# Patient Record
Sex: MALE | Race: OTHER | NOT HISPANIC OR LATINO | URBAN - METROPOLITAN AREA
[De-identification: names, ages, dates, MRNs, and addresses within clinical notes are randomized per-mention and may not be internally consistent; named-entity substitution may affect disease eponyms.]

---

## 2017-06-12 ENCOUNTER — OUTPATIENT (OUTPATIENT)
Dept: OUTPATIENT SERVICES | Facility: HOSPITAL | Age: 67
LOS: 1 days | Discharge: HOME | End: 2017-06-12

## 2017-06-28 DIAGNOSIS — I10 ESSENTIAL (PRIMARY) HYPERTENSION: ICD-10-CM

## 2018-04-09 ENCOUNTER — OUTPATIENT (OUTPATIENT)
Dept: OUTPATIENT SERVICES | Facility: HOSPITAL | Age: 68
LOS: 1 days | Discharge: HOME | End: 2018-04-09

## 2018-04-09 DIAGNOSIS — I10 ESSENTIAL (PRIMARY) HYPERTENSION: ICD-10-CM

## 2018-04-09 DIAGNOSIS — Z00.00 ENCOUNTER FOR GENERAL ADULT MEDICAL EXAMINATION WITHOUT ABNORMAL FINDINGS: ICD-10-CM

## 2018-04-09 DIAGNOSIS — Z13.1 ENCOUNTER FOR SCREENING FOR DIABETES MELLITUS: ICD-10-CM

## 2019-10-28 ENCOUNTER — OUTPATIENT (OUTPATIENT)
Dept: OUTPATIENT SERVICES | Facility: HOSPITAL | Age: 69
LOS: 1 days | Discharge: HOME | End: 2019-10-28

## 2019-10-28 DIAGNOSIS — Z13.1 ENCOUNTER FOR SCREENING FOR DIABETES MELLITUS: ICD-10-CM

## 2019-10-28 DIAGNOSIS — I10 ESSENTIAL (PRIMARY) HYPERTENSION: ICD-10-CM

## 2019-10-28 DIAGNOSIS — Z00.00 ENCOUNTER FOR GENERAL ADULT MEDICAL EXAMINATION WITHOUT ABNORMAL FINDINGS: ICD-10-CM

## 2019-10-28 DIAGNOSIS — Z13.29 ENCOUNTER FOR SCREENING FOR OTHER SUSPECTED ENDOCRINE DISORDER: ICD-10-CM

## 2022-05-25 PROBLEM — Z00.00 ENCOUNTER FOR PREVENTIVE HEALTH EXAMINATION: Status: ACTIVE | Noted: 2022-05-25

## 2022-06-06 ENCOUNTER — APPOINTMENT (OUTPATIENT)
Dept: CARDIOLOGY | Facility: CLINIC | Age: 72
End: 2022-06-06
Payer: MEDICARE

## 2022-06-06 VITALS
HEIGHT: 64 IN | SYSTOLIC BLOOD PRESSURE: 110 MMHG | DIASTOLIC BLOOD PRESSURE: 80 MMHG | BODY MASS INDEX: 37.39 KG/M2 | HEART RATE: 63 BPM | WEIGHT: 219 LBS

## 2022-06-06 DIAGNOSIS — Z78.9 OTHER SPECIFIED HEALTH STATUS: ICD-10-CM

## 2022-06-06 DIAGNOSIS — Z87.891 PERSONAL HISTORY OF NICOTINE DEPENDENCE: ICD-10-CM

## 2022-06-06 PROCEDURE — 99204 OFFICE O/P NEW MOD 45 MIN: CPT

## 2022-06-06 PROCEDURE — 93000 ELECTROCARDIOGRAM COMPLETE: CPT

## 2022-06-06 RX ORDER — OLMESARTAN MEDOXOMIL AND HYDROCHLOROTHIAZIDE 20; 12.5 MG/1; MG/1
20-12.5 TABLET ORAL DAILY
Qty: 90 | Refills: 3 | Status: ACTIVE | COMMUNITY

## 2022-06-06 RX ORDER — ASPIRIN 81 MG
81 TABLET, DELAYED RELEASE (ENTERIC COATED) ORAL DAILY
Refills: 0 | Status: ACTIVE | COMMUNITY

## 2022-06-06 NOTE — ASSESSMENT
[FreeTextEntry1] : Senior male with several cardiac risk factors.\par Exertional dyspnea / possible angina.\par \par Reported mild valve disease.\par \par BP controlled.

## 2022-06-06 NOTE — DISCUSSION/SUMMARY
[FreeTextEntry1] : Stress ECHO to evaluate for structural heart disease and stress induced ischemia.\par Cont Olmesartan-HCTZ.\par Follow-up 6-weeks.

## 2022-06-06 NOTE — PHYSICAL EXAM
[de-identified] : Sushant CORONEL [de-identified] : well appearing, overweight, no distress [de-identified] : reg, nL s12/s2, no m/r/g [de-identified] : CTA [de-identified] : alert, normal affect, logical conversation

## 2022-06-06 NOTE — HISTORY OF PRESENT ILLNESS
[FreeTextEntry1] : 72 year-old male referred for cardiac evaluation.\par \par False positive MPI / reportedly normal cath (15-y ago).\par Reported mild valve regurgitation.\par \par Risk factors include HTN.\par \par Relatively active (housework / walks dog 4-times / day).  Mild exertional dyspnea with prolonged distances / hills.  Attributes to knee pain / laboring.\par \par No chest pain.\par \par No palpitations, lightheadedness, syncope.

## 2022-07-22 ENCOUNTER — APPOINTMENT (OUTPATIENT)
Dept: CARDIOLOGY | Facility: CLINIC | Age: 72
End: 2022-07-22

## 2022-07-22 VITALS
WEIGHT: 213 LBS | DIASTOLIC BLOOD PRESSURE: 76 MMHG | SYSTOLIC BLOOD PRESSURE: 118 MMHG | HEIGHT: 64 IN | HEART RATE: 72 BPM | BODY MASS INDEX: 36.37 KG/M2

## 2022-07-22 DIAGNOSIS — Z86.79 PERSONAL HISTORY OF OTHER DISEASES OF THE CIRCULATORY SYSTEM: ICD-10-CM

## 2022-07-22 PROCEDURE — 99214 OFFICE O/P EST MOD 30 MIN: CPT | Mod: 25

## 2022-07-22 PROCEDURE — 93325 DOPPLER ECHO COLOR FLOW MAPG: CPT

## 2022-07-22 PROCEDURE — 93320 DOPPLER ECHO COMPLETE: CPT

## 2022-07-22 PROCEDURE — 93000 ELECTROCARDIOGRAM COMPLETE: CPT | Mod: 59

## 2022-07-22 PROCEDURE — 93351 STRESS TTE COMPLETE: CPT

## 2022-07-22 NOTE — REASON FOR VISIT
[FreeTextEntry1] : Scheduled for bilateral knee replacement.\par \par Feels well.\par \par Remains active.\par \par No angina.  Breathing comfortable.\par \par Stress ECHO (7/22/22): nL LVSF.  Trace AI / MR.  6:31 (92%). nL EKG / ECHO response.

## 2022-07-22 NOTE — DISCUSSION/SUMMARY
[FreeTextEntry1] : IMPRESSION\par Mild exertional dyspnea.\par Likely related to obesity / deconditioning.\par \par Trace valvular regurgitation (AI / MR).\par \par BP controlled.\par \par Low risk patient for perioperative cardiac events (RCRI = 0).\par Intermediate risk procedure.\par Reassuring stress ECHO.\par \par RECOMMEND:\par No further cardiac testing required prior to knee replacement.\par Cont Olmesartan-HCTZ.\par Regular PMD follow-up.\par Follow-up 1-year or as needed.

## 2022-07-22 NOTE — PHYSICAL EXAM
[de-identified] : well appearing, overweight, no distress [de-identified] : Sushant CORONEL [de-identified] : alert, normal affect, logical conversation

## 2022-09-22 ENCOUNTER — TRANSCRIPTION ENCOUNTER (OUTPATIENT)
Age: 72
End: 2022-09-22

## 2022-10-24 ENCOUNTER — RESULT REVIEW (OUTPATIENT)
Age: 72
End: 2022-10-24

## 2022-10-24 ENCOUNTER — OUTPATIENT (OUTPATIENT)
Dept: OUTPATIENT SERVICES | Facility: HOSPITAL | Age: 72
LOS: 1 days | End: 2022-10-24
Payer: MEDICARE

## 2022-10-24 ENCOUNTER — APPOINTMENT (OUTPATIENT)
Dept: ORTHOPEDIC SURGERY | Facility: CLINIC | Age: 72
End: 2022-10-24

## 2022-10-24 VITALS
HEART RATE: 60 BPM | HEIGHT: 64 IN | TEMPERATURE: 97.6 F | OXYGEN SATURATION: 97 % | BODY MASS INDEX: 36.37 KG/M2 | SYSTOLIC BLOOD PRESSURE: 134 MMHG | WEIGHT: 213 LBS | DIASTOLIC BLOOD PRESSURE: 80 MMHG

## 2022-10-24 DIAGNOSIS — M16.9 OSTEOARTHRITIS OF HIP, UNSPECIFIED: ICD-10-CM

## 2022-10-24 PROCEDURE — 99213 OFFICE O/P EST LOW 20 MIN: CPT

## 2022-10-24 PROCEDURE — 73564 X-RAY EXAM KNEE 4 OR MORE: CPT | Mod: 26,LT

## 2022-10-24 PROCEDURE — 99203 OFFICE O/P NEW LOW 30 MIN: CPT

## 2022-10-24 PROCEDURE — 73502 X-RAY EXAM HIP UNI 2-3 VIEWS: CPT | Mod: 26,RT

## 2022-10-24 PROCEDURE — 73564 X-RAY EXAM KNEE 4 OR MORE: CPT

## 2022-10-24 PROCEDURE — 73502 X-RAY EXAM HIP UNI 2-3 VIEWS: CPT

## 2022-10-24 RX ORDER — DICLOFENAC SODIUM 75 MG/1
75 TABLET, DELAYED RELEASE ORAL
Refills: 0 | Status: DISCONTINUED | COMMUNITY
End: 2022-10-24

## 2022-11-10 ENCOUNTER — APPOINTMENT (OUTPATIENT)
Dept: CARDIOLOGY | Facility: CLINIC | Age: 72
End: 2022-11-10

## 2022-11-10 VITALS
HEIGHT: 64 IN | HEART RATE: 61 BPM | BODY MASS INDEX: 36.54 KG/M2 | WEIGHT: 214 LBS | SYSTOLIC BLOOD PRESSURE: 126 MMHG | DIASTOLIC BLOOD PRESSURE: 80 MMHG

## 2022-11-10 PROCEDURE — 99214 OFFICE O/P EST MOD 30 MIN: CPT

## 2022-11-10 PROCEDURE — 93000 ELECTROCARDIOGRAM COMPLETE: CPT

## 2022-11-10 NOTE — ASSESSMENT
[FreeTextEntry1] : Mild exertional dyspnea.\par Likely related to obesity / deconditioning / knee pain.\par \par Trace valvular regurgitation (AI / MR).\par \par BP controlled.\par \par Low risk patient for perioperative cardiac events (RCRI = 0).\par Intermediate risk procedure.\par No cardiac decompensation.\par Reassuring stress ECHO.\par \par Stress ECHO (7/22/22): nL LVSF. Trace AI / MR. 6:31 (92%). nL EKG / ECHO response.

## 2022-11-10 NOTE — REASON FOR VISIT
[FreeTextEntry1] : Knee replacement postponed.  Required excision of benign tumor.  Now replacement scheduled.\par \par Feels well.\par \par Remains active.  No interval cardiac symptoms.\par \par Weight stable.

## 2022-11-10 NOTE — DISCUSSION/SUMMARY
[FreeTextEntry1] : No further cardiac testing required prior to knee replacement.\par Cont Olmesartan-HCTZ.\par Regular PMD follow-up.\par Follow-up 1-year or as needed.

## 2022-11-10 NOTE — PHYSICAL EXAM
[de-identified] : well appearing, overweight, no distress [de-identified] : reg, nL s1/s2, no m/r/g [de-identified] : CTA [de-identified] : alert, normal affect, logical conversation

## 2022-11-14 ENCOUNTER — RESULT REVIEW (OUTPATIENT)
Age: 72
End: 2022-11-14

## 2022-11-14 ENCOUNTER — OUTPATIENT (OUTPATIENT)
Dept: OUTPATIENT SERVICES | Facility: HOSPITAL | Age: 72
LOS: 1 days | End: 2022-11-14
Payer: MEDICARE

## 2022-11-14 ENCOUNTER — APPOINTMENT (OUTPATIENT)
Dept: ORTHOPEDIC SURGERY | Facility: CLINIC | Age: 72
End: 2022-11-14

## 2022-11-14 VITALS
OXYGEN SATURATION: 99 % | HEIGHT: 64 IN | BODY MASS INDEX: 36.54 KG/M2 | WEIGHT: 214 LBS | TEMPERATURE: 97.8 F | SYSTOLIC BLOOD PRESSURE: 134 MMHG | HEART RATE: 60 BPM | DIASTOLIC BLOOD PRESSURE: 81 MMHG

## 2022-11-14 DIAGNOSIS — M17.12 UNILATERAL PRIMARY OSTEOARTHRITIS, LEFT KNEE: ICD-10-CM

## 2022-11-14 PROCEDURE — 71046 X-RAY EXAM CHEST 2 VIEWS: CPT

## 2022-11-14 PROCEDURE — 71046 X-RAY EXAM CHEST 2 VIEWS: CPT | Mod: 26

## 2022-11-14 PROCEDURE — 99211 OFF/OP EST MAY X REQ PHY/QHP: CPT

## 2022-12-28 NOTE — HISTORY OF PRESENT ILLNESS
[de-identified] : Patient is a pleasant gentleman who was preparing to do a left total knee arthroplasty.  He had an excision of the mass which was benign.  We have been waiting for that incision to heal slowly to proceed with left knee arthroplasty.  He continues to have severe pain and limitations.  He is very anxious to proceed with surgery.  He denies any fevers chills night sweats nausea or vomiting.

## 2022-12-28 NOTE — DISCUSSION/SUMMARY
[de-identified] : Left Knee DJD. We discussed treatment options including conservative treatments and tka. Previously planned TKA delayed for mass excision which was complete with benign pathology. We reviewed recovery risks and benefits, including bleeding, infection, damage to nerves vessels tendons and ligaments, pain, stiffness, need for more surgery and death. All questions answered. We will plan for November pending completion of healing of excision scar. All questions answered. Plan home discharge.\par \par Equipment: [employer]; [employer]

## 2022-12-28 NOTE — DISCUSSION/SUMMARY
[de-identified] : Severe left knee arthritis.  We will plan left total knee arthroplasty but needed to do a biopsy procedure first.  At this point time there is no evidence for any infection of the incision but it is not completely healed and therefore I would recommend delay until it is.  We discussed this at length and the patient and spouse understand.  They can continue to monitor the wound and do the local treatments that they have been using and will reassess in a few weeks.  All questions answered.

## 2022-12-28 NOTE — HISTORY OF PRESENT ILLNESS
[8] : an average pain level of 8/10 [Standing] : standing [Daily] : ~He/She~ states the symptoms seem to be occuring daily [Direct Pressure] : worsened by direct pressure [Lifting] : worsened by lifting [Walking] : worsened by walking [Knee Flexion] : worsened with knee flexion [Knee Extension] : worsened with knee extension [Acetaminophen] : relieved by acetaminophen [de-identified] : Left knee pain with no trauma or injury. it has been worsening and is making walking difficult. Stairs are difficult as well. USing Meloxicam and Acetaminphen.  [Hip Movement] : not worsened by hip movement

## 2022-12-28 NOTE — PHYSICAL EXAM
[Antalgic] : antalgic [LE] : Sensory: Intact in bilateral lower extremities [DP] : dorsalis pedis 2+ and symmetric bilaterally [PT] : posterior tibial 2+ and symmetric bilaterally [de-identified] : Eschar with no redness or drainage from left knee anterior biopsy site [de-identified] : Xrays(3 views)  show severe DJD of left knee with tricompartmental changes and bone on bone medial compartment.

## 2022-12-28 NOTE — REVIEW OF SYSTEMS
[Joint Pain] : joint pain [Joint Stiffness] : joint stiffness [Joint Swelling] : joint swelling [Fever] : no fever [Chills] : no chills [Feeling Tired] : no fatigue [Recent Weight Gain (___ Lbs)] : no recent ~M [unfilled] weight gain

## 2022-12-28 NOTE — PHYSICAL EXAM
[de-identified] : On exam today is alert and oriented.  The incision is clean dry and intact with no erythema but there remains a central area of eschar.  He is otherwise neurologically intact.  There is a varus deformity that is not correctable.  There is no instability to the knee.

## 2023-01-12 ENCOUNTER — NON-APPOINTMENT (OUTPATIENT)
Age: 73
End: 2023-01-12

## 2023-01-13 RX ORDER — POVIDONE-IODINE 5 %
1 AEROSOL (ML) TOPICAL ONCE
Refills: 0 | Status: COMPLETED | OUTPATIENT
Start: 2023-01-17 | End: 2023-01-17

## 2023-01-13 NOTE — H&P ADULT - NSHPPHYSICALEXAM_GEN_ALL_CORE
MSK: + decreased ROM 2/2 pain, left knee      Remainder of exam per medical clearance note MSK: + decreased ROM 2/2 pain, left knee  Skin warm and well perfused, no visible wounds/erythema/ecchymoses  EHL/FHL/TA/GS 5/5 motor strength bilateral lower extremities   SLT in tact to distal bilateral lower extremities   DP pulses palpable bilaterally   Remainder of exam per medical clearance note

## 2023-01-13 NOTE — H&P ADULT - NSHPLABSRESULTS_GEN_ALL_CORE
Preop CBC, BMP, PT/INR, UA - WNL per medical clearance   Cr 1.2   Preop CXR - WNL per medical clearance   Preop EKG - sinus rhythm - WNL per medical clearance   Stress Echo 7/22/22 WNL   3M DOS

## 2023-01-13 NOTE — H&P ADULT - PROBLEM SELECTOR PLAN 1
Admit to Orthopaedic Service.  Presents today for elective   Pt medically stable and cleared for procedure today by Dr. Rose and Dr. Ferro

## 2023-01-13 NOTE — H&P ADULT - HISTORY OF PRESENT ILLNESS
72M c/o left knee pain x       Present for elective left total knee replacement  72M c/o left knee pain x chronic. Pt denies acute preceding trauma/injury. Pt states his knee pain is exacerbated with stair use. Pt takes meloxicam as needed for pain control. He denies numbness/tingling/weakness of bilateral lower extremities. He does not ambulate with an assistive device at baseline. Denies DVT hx; denies CP, SOB, N/V, tactile fevers, calf pain.       Present for elective left total knee replacement

## 2023-01-13 NOTE — H&P ADULT - NSICDXPASTMEDICALHX_GEN_ALL_CORE_FT
PAST MEDICAL HISTORY:  Osteoarthritis of left knee      PAST MEDICAL HISTORY:  HLD (hyperlipidemia)     HTN (hypertension)     Osteoarthritis of left knee

## 2023-01-16 ENCOUNTER — TRANSCRIPTION ENCOUNTER (OUTPATIENT)
Age: 73
End: 2023-01-16

## 2023-01-16 VITALS
TEMPERATURE: 98 F | OXYGEN SATURATION: 100 % | WEIGHT: 209 LBS | HEIGHT: 64 IN | SYSTOLIC BLOOD PRESSURE: 144 MMHG | RESPIRATION RATE: 16 BRPM | HEART RATE: 62 BPM | DIASTOLIC BLOOD PRESSURE: 73 MMHG

## 2023-01-16 NOTE — PRE-ANESTHESIA EVALUATION ADULT - NSANTHLABRESULTSFT_GEN_ALL_CORE
Preop CBC, BMP, PT/INR, UA - WNL   Cr 1.2     Preop EKG - sinus rhythm - WNL per medical clearance   Stress Echo 7/22/22 WNL   3M DOS

## 2023-01-16 NOTE — ASU PATIENT PROFILE, ADULT - NSICDXPASTMEDICALHX_GEN_ALL_CORE_FT
PAST MEDICAL HISTORY:  HLD (hyperlipidemia)     HTN (hypertension)     Osteoarthritis of left knee

## 2023-01-16 NOTE — PRE-ANESTHESIA EVALUATION ADULT - NSRADCARDRESULTSFT_GEN_ALL_CORE
XR CHEST PA LAT PST 2V                        PROCEDURE DATE:  11/14/2022    INTERPRETATION:  Clinical history reason for exam: Preop.  PA and lateral.  No comparison.  Findings/  impression: Heart size within normal limits, thoracic aortic   calcification. Right upper lobe punctate calcified granuloma. No acute   focal opacity.. . Bilateral hilar calcified adenopathy. Thoracic spine   and bilateral AC joint degenerative changes.    Preop EKG - sinus rhythm - WNL

## 2023-01-16 NOTE — ASU PATIENT PROFILE, ADULT - FALL HARM RISK - HARM RISK INTERVENTIONS

## 2023-01-17 ENCOUNTER — RESULT REVIEW (OUTPATIENT)
Age: 73
End: 2023-01-17

## 2023-01-17 ENCOUNTER — APPOINTMENT (OUTPATIENT)
Dept: ORTHOPEDIC SURGERY | Facility: HOSPITAL | Age: 73
End: 2023-01-17

## 2023-01-17 ENCOUNTER — INPATIENT (INPATIENT)
Facility: HOSPITAL | Age: 73
LOS: 1 days | Discharge: HOME HEALTH | DRG: 470 | End: 2023-01-19
Attending: SPECIALIST | Admitting: SPECIALIST
Payer: MEDICARE

## 2023-01-17 DIAGNOSIS — E78.5 HYPERLIPIDEMIA, UNSPECIFIED: ICD-10-CM

## 2023-01-17 DIAGNOSIS — Z98.890 OTHER SPECIFIED POSTPROCEDURAL STATES: Chronic | ICD-10-CM

## 2023-01-17 DIAGNOSIS — I10 ESSENTIAL (PRIMARY) HYPERTENSION: ICD-10-CM

## 2023-01-17 DIAGNOSIS — Z96.641 PRESENCE OF RIGHT ARTIFICIAL HIP JOINT: Chronic | ICD-10-CM

## 2023-01-17 DIAGNOSIS — M17.12 UNILATERAL PRIMARY OSTEOARTHRITIS, LEFT KNEE: ICD-10-CM

## 2023-01-17 PROCEDURE — S2900 ROBOTIC SURGICAL SYSTEM: CPT | Mod: NC

## 2023-01-17 PROCEDURE — 27447 TOTAL KNEE ARTHROPLASTY: CPT | Mod: LT

## 2023-01-17 PROCEDURE — 73560 X-RAY EXAM OF KNEE 1 OR 2: CPT | Mod: 26,LT

## 2023-01-17 DEVICE — BASEPLATE TIB UNIV TRIATHLON SZ 5: Type: IMPLANTABLE DEVICE | Status: FUNCTIONAL

## 2023-01-17 DEVICE — COMP FEM TRIATHLON CR SZ 5 LT: Type: IMPLANTABLE DEVICE | Status: FUNCTIONAL

## 2023-01-17 DEVICE — CEMENT PALACOS R: Type: IMPLANTABLE DEVICE | Status: FUNCTIONAL

## 2023-01-17 DEVICE — INSERT TIB BEARING CS X3 SZ 5 10MM: Type: IMPLANTABLE DEVICE | Status: FUNCTIONAL

## 2023-01-17 DEVICE — MAKO BONE PIN 4MM X 140MM: Type: IMPLANTABLE DEVICE | Status: FUNCTIONAL

## 2023-01-17 DEVICE — IMP PATELLA ASYMMETRIC X3 35X10MM: Type: IMPLANTABLE DEVICE | Status: FUNCTIONAL

## 2023-01-17 DEVICE — MAKO BONE PIN 4MM X 110MM: Type: IMPLANTABLE DEVICE | Status: FUNCTIONAL

## 2023-01-17 RX ORDER — CELECOXIB 200 MG/1
100 CAPSULE ORAL EVERY 12 HOURS
Refills: 0 | Status: DISCONTINUED | OUTPATIENT
Start: 2023-01-17 | End: 2023-01-19

## 2023-01-17 RX ORDER — TRAMADOL HYDROCHLORIDE 50 MG/1
50 TABLET ORAL EVERY 6 HOURS
Refills: 0 | Status: DISCONTINUED | OUTPATIENT
Start: 2023-01-17 | End: 2023-01-18

## 2023-01-17 RX ORDER — TRAMADOL HYDROCHLORIDE 50 MG/1
25 TABLET ORAL EVERY 6 HOURS
Refills: 0 | Status: DISCONTINUED | OUTPATIENT
Start: 2023-01-17 | End: 2023-01-18

## 2023-01-17 RX ORDER — LOSARTAN POTASSIUM 100 MG/1
50 TABLET, FILM COATED ORAL DAILY
Refills: 0 | Status: DISCONTINUED | OUTPATIENT
Start: 2023-01-18 | End: 2023-01-19

## 2023-01-17 RX ORDER — POLYETHYLENE GLYCOL 3350 17 G/17G
17 POWDER, FOR SOLUTION ORAL AT BEDTIME
Refills: 0 | Status: DISCONTINUED | OUTPATIENT
Start: 2023-01-17 | End: 2023-01-19

## 2023-01-17 RX ORDER — FAMOTIDINE 10 MG/ML
20 INJECTION INTRAVENOUS EVERY 12 HOURS
Refills: 0 | Status: DISCONTINUED | OUTPATIENT
Start: 2023-01-17 | End: 2023-01-19

## 2023-01-17 RX ORDER — ONDANSETRON 8 MG/1
4 TABLET, FILM COATED ORAL EVERY 6 HOURS
Refills: 0 | Status: DISCONTINUED | OUTPATIENT
Start: 2023-01-17 | End: 2023-01-19

## 2023-01-17 RX ORDER — CEFAZOLIN SODIUM 1 G
2000 VIAL (EA) INJECTION EVERY 8 HOURS
Refills: 0 | Status: COMPLETED | OUTPATIENT
Start: 2023-01-17 | End: 2023-01-18

## 2023-01-17 RX ORDER — SODIUM CHLORIDE 9 MG/ML
1000 INJECTION, SOLUTION INTRAVENOUS
Refills: 0 | Status: DISCONTINUED | OUTPATIENT
Start: 2023-01-17 | End: 2023-01-19

## 2023-01-17 RX ORDER — MAGNESIUM HYDROXIDE 400 MG/1
30 TABLET, CHEWABLE ORAL DAILY
Refills: 0 | Status: DISCONTINUED | OUTPATIENT
Start: 2023-01-17 | End: 2023-01-19

## 2023-01-17 RX ORDER — ASPIRIN/CALCIUM CARB/MAGNESIUM 324 MG
81 TABLET ORAL
Refills: 0 | Status: DISCONTINUED | OUTPATIENT
Start: 2023-01-18 | End: 2023-01-19

## 2023-01-17 RX ORDER — CHLORHEXIDINE GLUCONATE 213 G/1000ML
1 SOLUTION TOPICAL EVERY 12 HOURS
Refills: 0 | Status: COMPLETED | OUTPATIENT
Start: 2023-01-17 | End: 2023-01-17

## 2023-01-17 RX ORDER — ACETAMINOPHEN 500 MG
975 TABLET ORAL EVERY 8 HOURS
Refills: 0 | Status: DISCONTINUED | OUTPATIENT
Start: 2023-01-17 | End: 2023-01-19

## 2023-01-17 RX ORDER — SENNA PLUS 8.6 MG/1
2 TABLET ORAL AT BEDTIME
Refills: 0 | Status: DISCONTINUED | OUTPATIENT
Start: 2023-01-17 | End: 2023-01-19

## 2023-01-17 RX ORDER — OLMESARTAN MEDOXOMIL-HYDROCHLOROTHIAZIDE 25; 40 MG/1; MG/1
1 TABLET, FILM COATED ORAL
Qty: 0 | Refills: 0 | DISCHARGE

## 2023-01-17 RX ADMIN — SODIUM CHLORIDE 75 MILLILITER(S): 9 INJECTION, SOLUTION INTRAVENOUS at 23:23

## 2023-01-17 RX ADMIN — Medication 975 MILLIGRAM(S): at 21:14

## 2023-01-17 RX ADMIN — CHLORHEXIDINE GLUCONATE 1 APPLICATION(S): 213 SOLUTION TOPICAL at 07:19

## 2023-01-17 RX ADMIN — POLYETHYLENE GLYCOL 3350 17 GRAM(S): 17 POWDER, FOR SOLUTION ORAL at 21:14

## 2023-01-17 RX ADMIN — SENNA PLUS 2 TABLET(S): 8.6 TABLET ORAL at 21:14

## 2023-01-17 RX ADMIN — Medication 100 MILLIGRAM(S): at 16:43

## 2023-01-17 RX ADMIN — Medication 1 TABLET(S): at 13:17

## 2023-01-17 RX ADMIN — TRAMADOL HYDROCHLORIDE 50 MILLIGRAM(S): 50 TABLET ORAL at 16:43

## 2023-01-17 RX ADMIN — TRAMADOL HYDROCHLORIDE 50 MILLIGRAM(S): 50 TABLET ORAL at 17:15

## 2023-01-17 RX ADMIN — TRAMADOL HYDROCHLORIDE 50 MILLIGRAM(S): 50 TABLET ORAL at 23:09

## 2023-01-17 RX ADMIN — FAMOTIDINE 20 MILLIGRAM(S): 10 INJECTION INTRAVENOUS at 16:43

## 2023-01-17 RX ADMIN — Medication 975 MILLIGRAM(S): at 22:10

## 2023-01-17 RX ADMIN — Medication 1 APPLICATION(S): at 07:18

## 2023-01-17 NOTE — PHYSICAL THERAPY INITIAL EVALUATION ADULT - GAIT DEVIATIONS NOTED, PT EVAL
2 minor LOB during turns requiring min A to recover, minor VCs to maintain rolling walker on ground/decreased jaimie

## 2023-01-17 NOTE — PHYSICAL THERAPY INITIAL EVALUATION ADULT - PERTINENT HX OF CURRENT PROBLEM, REHAB EVAL
72M c/o left knee pain x chronic. Pt denies acute preceding trauma/injury. Pt states his knee pain is exacerbated with stair use. Pt takes meloxicam as needed for pain control. He denies numbness/tingling/weakness of bilateral lower extremities. He does not ambulate with an assistive device at baseline.

## 2023-01-17 NOTE — PHYSICAL THERAPY INITIAL EVALUATION ADULT - GENERAL OBSERVATIONS, REHAB EVAL
Received supine denies pain at rest +IV, cryocuff, B SCD. left as found +lines intact, RN aware, call bell

## 2023-01-17 NOTE — PHYSICAL THERAPY INITIAL EVALUATION ADULT - ADDITIONAL COMMENTS
independent prior to arrival , no falls in past year, house, 28 steps to enter, has straight cane and rolling walker from previous surgery Yes

## 2023-01-18 ENCOUNTER — TRANSCRIPTION ENCOUNTER (OUTPATIENT)
Age: 73
End: 2023-01-18

## 2023-01-18 LAB
ANION GAP SERPL CALC-SCNC: 7 MMOL/L — SIGNIFICANT CHANGE UP (ref 5–17)
BUN SERPL-MCNC: 33 MG/DL — HIGH (ref 7–23)
CALCIUM SERPL-MCNC: 8.5 MG/DL — SIGNIFICANT CHANGE UP (ref 8.4–10.5)
CHLORIDE SERPL-SCNC: 106 MMOL/L — SIGNIFICANT CHANGE UP (ref 96–108)
CO2 SERPL-SCNC: 22 MMOL/L — SIGNIFICANT CHANGE UP (ref 22–31)
CREAT SERPL-MCNC: 1.35 MG/DL — HIGH (ref 0.5–1.3)
EGFR: 56 ML/MIN/1.73M2 — LOW
GLUCOSE SERPL-MCNC: 133 MG/DL — HIGH (ref 70–99)
HCT VFR BLD CALC: 33.8 % — LOW (ref 39–50)
HCV AB S/CO SERPL IA: 0.04 S/CO — SIGNIFICANT CHANGE UP
HCV AB SERPL-IMP: SIGNIFICANT CHANGE UP
HGB BLD-MCNC: 10.9 G/DL — LOW (ref 13–17)
MCHC RBC-ENTMCNC: 29 PG — SIGNIFICANT CHANGE UP (ref 27–34)
MCHC RBC-ENTMCNC: 32.2 GM/DL — SIGNIFICANT CHANGE UP (ref 32–36)
MCV RBC AUTO: 89.9 FL — SIGNIFICANT CHANGE UP (ref 80–100)
NRBC # BLD: 0 /100 WBCS — SIGNIFICANT CHANGE UP (ref 0–0)
PLATELET # BLD AUTO: 182 K/UL — SIGNIFICANT CHANGE UP (ref 150–400)
POTASSIUM SERPL-MCNC: 4.9 MMOL/L — SIGNIFICANT CHANGE UP (ref 3.5–5.3)
POTASSIUM SERPL-SCNC: 4.9 MMOL/L — SIGNIFICANT CHANGE UP (ref 3.5–5.3)
RBC # BLD: 3.76 M/UL — LOW (ref 4.2–5.8)
RBC # FLD: 13.3 % — SIGNIFICANT CHANGE UP (ref 10.3–14.5)
SODIUM SERPL-SCNC: 135 MMOL/L — SIGNIFICANT CHANGE UP (ref 135–145)
WBC # BLD: 9.34 K/UL — SIGNIFICANT CHANGE UP (ref 3.8–10.5)
WBC # FLD AUTO: 9.34 K/UL — SIGNIFICANT CHANGE UP (ref 3.8–10.5)

## 2023-01-18 PROCEDURE — 99222 1ST HOSP IP/OBS MODERATE 55: CPT

## 2023-01-18 RX ORDER — HYDROMORPHONE HYDROCHLORIDE 2 MG/ML
0.5 INJECTION INTRAMUSCULAR; INTRAVENOUS; SUBCUTANEOUS EVERY 4 HOURS
Refills: 0 | Status: DISCONTINUED | OUTPATIENT
Start: 2023-01-18 | End: 2023-01-19

## 2023-01-18 RX ORDER — OXYCODONE HYDROCHLORIDE 5 MG/1
5 TABLET ORAL EVERY 4 HOURS
Refills: 0 | Status: DISCONTINUED | OUTPATIENT
Start: 2023-01-18 | End: 2023-01-19

## 2023-01-18 RX ADMIN — HYDROMORPHONE HYDROCHLORIDE 0.5 MILLIGRAM(S): 2 INJECTION INTRAMUSCULAR; INTRAVENOUS; SUBCUTANEOUS at 04:21

## 2023-01-18 RX ADMIN — LOSARTAN POTASSIUM 50 MILLIGRAM(S): 100 TABLET, FILM COATED ORAL at 06:13

## 2023-01-18 RX ADMIN — POLYETHYLENE GLYCOL 3350 17 GRAM(S): 17 POWDER, FOR SOLUTION ORAL at 21:50

## 2023-01-18 RX ADMIN — CELECOXIB 100 MILLIGRAM(S): 200 CAPSULE ORAL at 17:02

## 2023-01-18 RX ADMIN — CELECOXIB 100 MILLIGRAM(S): 200 CAPSULE ORAL at 06:20

## 2023-01-18 RX ADMIN — Medication 1 TABLET(S): at 13:17

## 2023-01-18 RX ADMIN — Medication 975 MILLIGRAM(S): at 07:00

## 2023-01-18 RX ADMIN — OXYCODONE HYDROCHLORIDE 5 MILLIGRAM(S): 5 TABLET ORAL at 22:50

## 2023-01-18 RX ADMIN — HYDROMORPHONE HYDROCHLORIDE 0.5 MILLIGRAM(S): 2 INJECTION INTRAMUSCULAR; INTRAVENOUS; SUBCUTANEOUS at 04:06

## 2023-01-18 RX ADMIN — SENNA PLUS 2 TABLET(S): 8.6 TABLET ORAL at 21:51

## 2023-01-18 RX ADMIN — Medication 100 MILLIGRAM(S): at 01:09

## 2023-01-18 RX ADMIN — Medication 975 MILLIGRAM(S): at 21:51

## 2023-01-18 RX ADMIN — Medication 975 MILLIGRAM(S): at 13:53

## 2023-01-18 RX ADMIN — Medication 81 MILLIGRAM(S): at 06:13

## 2023-01-18 RX ADMIN — Medication 975 MILLIGRAM(S): at 06:13

## 2023-01-18 RX ADMIN — CELECOXIB 100 MILLIGRAM(S): 200 CAPSULE ORAL at 07:11

## 2023-01-18 RX ADMIN — TRAMADOL HYDROCHLORIDE 50 MILLIGRAM(S): 50 TABLET ORAL at 13:16

## 2023-01-18 RX ADMIN — OXYCODONE HYDROCHLORIDE 5 MILLIGRAM(S): 5 TABLET ORAL at 15:50

## 2023-01-18 RX ADMIN — FAMOTIDINE 20 MILLIGRAM(S): 10 INJECTION INTRAVENOUS at 06:13

## 2023-01-18 RX ADMIN — OXYCODONE HYDROCHLORIDE 5 MILLIGRAM(S): 5 TABLET ORAL at 16:24

## 2023-01-18 RX ADMIN — OXYCODONE HYDROCHLORIDE 5 MILLIGRAM(S): 5 TABLET ORAL at 23:50

## 2023-01-18 RX ADMIN — Medication 975 MILLIGRAM(S): at 22:51

## 2023-01-18 RX ADMIN — TRAMADOL HYDROCHLORIDE 50 MILLIGRAM(S): 50 TABLET ORAL at 00:05

## 2023-01-18 RX ADMIN — FAMOTIDINE 20 MILLIGRAM(S): 10 INJECTION INTRAVENOUS at 17:03

## 2023-01-18 RX ADMIN — CELECOXIB 100 MILLIGRAM(S): 200 CAPSULE ORAL at 17:40

## 2023-01-18 RX ADMIN — Medication 81 MILLIGRAM(S): at 17:02

## 2023-01-18 RX ADMIN — Medication 975 MILLIGRAM(S): at 13:16

## 2023-01-18 RX ADMIN — TRAMADOL HYDROCHLORIDE 50 MILLIGRAM(S): 50 TABLET ORAL at 13:53

## 2023-01-18 NOTE — DISCHARGE NOTE PROVIDER - NSDCMRMEDTOKEN_GEN_ALL_CORE_FT
Aspir 81 oral delayed release tablet: 1 tab(s) orally once a day  olmesartan-hydrochlorothiazide 20 mg-12.5 mg oral tablet: 1 tab(s) orally once a day   acetaminophen 325 mg oral tablet: 3 tab(s) orally every 8 hours  aspirin 81 mg oral delayed release tablet: 1 tab(s) orally 2 times a day  celecoxib 100 mg oral capsule: 1 cap(s) orally every 12 hours  famotidine 20 mg oral tablet: 1 tab(s) orally once a day  olmesartan-hydrochlorothiazide 20 mg-12.5 mg oral tablet: 1 tab(s) orally once a day  oxyCODONE 5 mg oral tablet: 1-2 tab(s) orally every 4 hours, As needed for  Severe Pain MDD:6

## 2023-01-18 NOTE — PATIENT PROFILE ADULT - FUNCTIONAL ASSESSMENT - BASIC MOBILITY 6.
3-calculated by average/Not able to assess (calculate score using [facility] averaging method)

## 2023-01-18 NOTE — DISCHARGE NOTE PROVIDER - NSDCACTIVITY_GEN_ALL_CORE
Do not drive or operate machinery/Showering allowed/Do not make important decisions/Stairs allowed/Walking - Indoors allowed/No heavy lifting/straining/Walking - Outdoors allowed/Follow Instructions Provided by your Surgical Team Do not drive or operate machinery/Showering allowed/Stairs allowed/Walking - Indoors allowed/No heavy lifting/straining/Walking - Outdoors allowed/Follow Instructions Provided by your Surgical Team

## 2023-01-18 NOTE — CONSULT NOTE ADULT - SUBJECTIVE AND OBJECTIVE BOX
HPI:  This is a 73yo gentleman with a PMH of OA, essential HTN and HLD who presented for an elective L-TKA on 1/17.  He reports having some poorly controlled pain overnight that improved with Dilaudid, not so much with Celebrex.  This morning he tolerated PO intake, passing flatus but no BM and voiding on his own.  He anticipates PT coming back this afternoon.  No prior post-op complications.  Denies fevers, chill, CP, SOB or palpitations  Remainder of his 12-point ROS otherwise negative.      PAST MEDICAL & SURGICAL HISTORY:  Osteoarthritis of left knee  HTN (hypertension)  HLD (hyperlipidemia)  History of right hip replacement  H/O shoulder surgery    Home Medications:  Aspir 81 oral delayed release tablet: 1 tab(s) orally once a day (17 Jan 2023 07:09)  olmesartan-hydrochlorothiazide 20 mg-12.5 mg oral tablet: 1 tab(s) orally once a day (17 Jan 2023 07:09)    Allergies  No Known Allergies    FAMILY HISTORY:  Non-contributory to this presentation.    Social History:  Independent w/ADL's.     CURRENT MEDICATIONS:   acetaminophen     Tablet .. 975 milliGRAM(s) Oral every 8 hours  aspirin enteric coated 81 milliGRAM(s) Oral two times a day  celecoxib 100 milliGRAM(s) Oral every 12 hours  famotidine    Tablet 20 milliGRAM(s) Oral every 12 hours  hydrochlorothiazide 12.5 milliGRAM(s) Oral daily  HYDROmorphone  Injectable 0.5 milliGRAM(s) IV Push every 4 hours PRN  lactated ringers. 1000 milliLiter(s) IV Continuous <Continuous>  losartan 50 milliGRAM(s) Oral daily  magnesium hydroxide Suspension 30 milliLiter(s) Oral daily PRN  multivitamin 1 Tablet(s) Oral daily  ondansetron Injectable 4 milliGRAM(s) IV Push every 6 hours PRN  polyethylene glycol 3350 17 Gram(s) Oral at bedtime  senna 2 Tablet(s) Oral at bedtime  traMADol 50 milliGRAM(s) Oral every 6 hours PRN  traMADol 25 milliGRAM(s) Oral every 6 hours PRN    VITAL SIGNS, INS/OUTS (last 24 hours):  Vital Signs Last 24 Hrs  T(C): 36.3 (18 Jan 2023 08:45), Max: 36.6 (17 Jan 2023 15:59)  T(F): 97.4 (18 Jan 2023 08:45), Max: 97.8 (17 Jan 2023 15:59)  HR: 66 (18 Jan 2023 08:45) (52 - 76)  BP: 110/56 (18 Jan 2023 08:45) (110/56 - 158/77)  BP(mean): 93 (17 Jan 2023 13:40) (87 - 107)  RR: 16 (18 Jan 2023 08:45) (14 - 18)  SpO2: 99% (18 Jan 2023 08:45) (95% - 100%)    Parameters below as of 18 Jan 2023 08:45  Patient On (Oxygen Delivery Method): room air    I&O's Summary    17 Jan 2023 07:01  -  18 Jan 2023 07:00  --------------------------------------------------------  IN: 1690 mL / OUT: 850 mL / NET: 840 mL    18 Jan 2023 07:01  -  18 Jan 2023 11:23  --------------------------------------------------------  IN: 240 mL / OUT: 600 mL / NET: -360 mL    EXAM:  Gen: NAD, sitting upright in bed  HEENT: NCAT, MMM, clear OP  Neck: supple, trachea at midline  CV: RRR, no m/g/r appreciated  Pulm: CTA B, no w/r/r; no increase in WOB  Abd: normoactive BS, soft, NTND  Ext: WWP, 2+ pulses x4; no c/c/e; L-knee +cooling brace   Neuro: AOx3, nonfocal  Psych: pleasant, conversant and appropriate    BASIC LABS:                        10.9   9.34  )-----------( 182      ( 18 Jan 2023 05:30 )             33.8     01-18    135  |  106  |  33<H>  ----------------------------<  133<H>  4.9   |  22  |  1.35<H>    Ca    8.5      18 Jan 2023 05:30    MICRODATA:  -- No new microdata.    IMAGING:  -- No new imaging.

## 2023-01-18 NOTE — DISCHARGE NOTE PROVIDER - CARE PROVIDER_API CALL
Jayy Bautista (MD; MS)  Orthopaedic Surgery  [address] [address]  Phone: [phone]  Fax: [phone]  Follow Up Time:    Jayy Bautista (MD; MS)  Orthopaedic Surgery  [address] [address]  Phone: [phone]  Fax: [phone]  Follow Up Time: 2 weeks

## 2023-01-18 NOTE — DISCHARGE NOTE PROVIDER - NSDCFUADDINST_GEN_ALL_CORE_FT
See Dr. Bautista's attached discharge instructions     You may take showers. Your dressing is water resistant. Let soapy water fall over incision and pat dry.   No soaking in bathtubs.  Leave incision open to air. Keep incision clean and dry. Do not remove the dressing/tape overlying your incision.  See Dr. Bautista's attached discharge instructions     Dressing: Prineo (meshlike dressing directly over your incision), ACE wrap bandage   You may take showers. Your dressing is water resistant. Let soapy water fall over incision and pat dry.   No soaking in bathtubs.  Leave incision open to air. Keep incision clean and dry. Do not remove the dressing/tape overlying your incision.     ACTIVITY:  - Weightbear as tolerated with assistive device. No strenuous activity, heavy lifting, driving or returning to work until cleared by MD.  - You may experience postoperative swelling on the operative extremity. You may ice the surgery site for 20 minute intervals.  - If you have had a knee replacement, do not place pillows or bolsters underneath the back of the knee.     DRESSING: ***  - You may shower, your dressing is water-resistant. Do not soak in bathtubs. Do not remove your dressing, your surgeon will remove it at your first post-op visit. If your dressing falls off before then, you may leave it open to air and keep it clean and dry.   - Keep your incision clean and dry. Do not pick at your incision. Do not apply creams, ointments or oils to your incision until cleared by your surgeon. Do not soak your incision in sitting water (ie tubs, pools, lakes, etc.) until cleared by your surgeon. You may let clean, running water fall over your incision.    MEDICATION/ANTICOAGULATION:  -You have been prescribed Aspiring 81mg twice daily, as a preventative to help prevent postoperative blood clots. Please take this medication as prescribed.   - You have been prescribed medications for pain:    - Tylenol for mild to moderate pain. Do not exceed 3,000mg daily.    - For more severe pain, take Tylenol with the addition of narcotic pain medication. Take this medication as prescribed. This medication may cause drowsiness or dizziness. Do not operate machinery. This medication may cause constipation.  - For any additional medications, follow instructions on the bottle.   -Try to have regular bowel movements. Take stool softener or laxative if necessary. You may wish to take Miralax daily until you have regular bowel movements.   - If you have been prescribed Aspirin or an anti-inflammatory, please take Prilosec once a day, before breakfast, until no longer taking Aspirin or anti-inflammatory. This will help protect your stomach.  - If you have a pain management physician, please follow-up with them postoperatively.   - If you experience any negative side effects of your medications, please call your surgeon's office to discuss.    Follow-up:  - Call to schedule an appt with Dr. Bautista for follow up.  - Please follow-up with your primary care physician or any other specialist you see postoperatively, if needed.   - Contact your doctor if you experience: fever greater than 101.5, chills, chest pain, difficulty breathing, redness or excessive drainage around the incision, other concerns.   See Dr. Bautista's attached discharge instructions     ACTIVITY:  - Weightbear as tolerated with assistive device. No strenuous activity, heavy lifting, driving or returning to work until cleared by MD.  - You may experience postoperative swelling on the operative extremity. You may ice the surgery site for 20 minute intervals.  - If you have had a knee replacement, do not place pillows or bolsters underneath the back of the knee.     DRESSING: (meshlike dressing directly over your incision), ACE wrap bandage   - You may shower, your dressing is water-resistant. Do not soak in bathtubs. Do not remove your dressing, your surgeon will remove it at your first post-op visit. If your dressing falls off before then, you may leave it open to air and keep it clean and dry.   - Keep your incision clean and dry. Do not pick at your incision. Do not apply creams, ointments or oils to your incision until cleared by your surgeon. Do not soak your incision in sitting water (ie tubs, pools, lakes, etc.) until cleared by your surgeon. You may let clean, running water fall over your incision.    MEDICATION/ANTICOAGULATION:  -You have been prescribed Aspirin 81mg twice daily, as a preventative to help prevent postoperative blood clots. Please take this medication as prescribed.   - You have been prescribed medications for pain:    - Tylenol for mild to moderate pain. Do not exceed 3,000mg daily.    - For more severe pain, take Tylenol with the addition of narcotic pain medication. Take this medication as prescribed. This medication may cause drowsiness or dizziness. Do not operate machinery. This medication may cause constipation.  - For any additional medications, follow instructions on the bottle.   -Try to have regular bowel movements. Take stool softener or laxative if necessary. You may wish to take Miralax daily until you have regular bowel movements.   - If you have been prescribed Aspirin or an anti-inflammatory, please take Prilosec once a day, before breakfast, until no longer taking Aspirin or anti-inflammatory. This will help protect your stomach.  - If you have a pain management physician, please follow-up with them postoperatively.   - If you experience any negative side effects of your medications, please call your surgeon's office to discuss.    Follow-up:  - Call to schedule an appt with Dr. Bautista for follow up.  - Please follow-up with your primary care physician or any other specialist you see postoperatively, if needed.   - Contact your doctor if you experience: fever greater than 101.5, chills, chest pain, difficulty breathing, redness or excessive drainage around the incision, other concerns.   Follow Dr. Bautista's paper discharge instructions. Take prescriptions as directed on paper instruction sheets.    ACTIVITY:  - Weightbear as tolerated with assistive device. No strenuous activity, heavy lifting, driving or returning to work until cleared by MD.  - You may experience postoperative swelling on the operative extremity. You may ice the surgery site for 20 minute intervals.  - If you have had a knee replacement, do not place pillows or bolsters underneath the back of the knee.     DRESSING: (meshlike dressing directly over your incision), ACE wrap bandage   - Wait 4 days after surgery to unwrap ace bandage and shower. Do not soak in bathtubs. Do not peel off the mesh, your surgeon will remove it at your first post-op visit. If your dressing falls off before then, you may leave it open to air and keep it clean and dry.   - Keep your incision clean and dry. Do not pick at your incision. Do not apply creams, ointments or oils to your incision until cleared by your surgeon. Do not soak your incision in sitting water (ie tubs, pools, lakes, etc.) until cleared by your surgeon. You may let clean, running water fall over your incision.    MEDICATION/ANTICOAGULATION:  -You have been prescribed Aspirin 81mg twice daily, as a preventative to help prevent postoperative blood clots. Please take this medication as prescribed.   - You have been prescribed medications for pain:    - Tylenol for mild to moderate pain. Do not exceed 3,000mg daily.    - For more severe pain, take Tylenol with the addition of narcotic pain medication. Take this medication as prescribed. This medication may cause drowsiness or dizziness. Do not operate machinery. This medication may cause constipation.  - For any additional medications, follow instructions on the bottle.   -Try to have regular bowel movements. Take stool softener or laxative if necessary. You may wish to take Miralax daily until you have regular bowel movements.   - If you have been prescribed Aspirin or an anti-inflammatory, please take Pepcid or Prilosec once a day, before breakfast, until no longer taking Aspirin or anti-inflammatory. This will help protect your stomach.  - If you have a pain management physician, please follow-up with them postoperatively.   - If you experience any negative side effects of your medications, please call your surgeon's office to discuss.    Follow-up:  - Call to schedule an appt with Dr. Bautista for follow up.  - Please follow-up with your primary care physician or any other specialist you see postoperatively, if needed.   - Contact your doctor if you experience: fever greater than 101.5, chills, chest pain, difficulty breathing, redness or excessive drainage around the incision, other concerns.

## 2023-01-18 NOTE — DISCHARGE NOTE PROVIDER - NSDCCPCAREPLAN_GEN_ALL_CORE_FT
PRINCIPAL DISCHARGE DIAGNOSIS  Diagnosis: Osteoarthritis of left knee  Assessment and Plan of Treatment:        PRINCIPAL DISCHARGE DIAGNOSIS  Diagnosis: Osteoarthritis of left knee  Assessment and Plan of Treatment: improvement s/p Left TKR

## 2023-01-18 NOTE — DISCHARGE NOTE PROVIDER - NSDCCPTREATMENT_GEN_ALL_CORE_FT
PRINCIPAL PROCEDURE  Procedure: Total left knee replacement  Findings and Treatment:        PRINCIPAL PROCEDURE  Procedure: Total left knee replacement  Findings and Treatment: OA

## 2023-01-18 NOTE — DISCHARGE NOTE PROVIDER - DISCHARGE DIET
*NTG, OU: INTRAOCULAR PRESSURE IS WITHIN ACCEPTABLE LIMITS WITHOUT DROPS. PATIENT IS UNABLE TO TAKE MULTIPLE GLAUCOMA DROPS. OK TO STAY OFF LATANOPROST. CONSIDER SLT OU IF CHANGES ARE NOTED ON VF. RETURN FOR FOLLOW-UP AS SCHEDULED. Regular Diet - No restrictions

## 2023-01-18 NOTE — CONSULT NOTE ADULT - ASSESSMENT
This is a 71yo gentleman with a PMH of OA, essential HTN and HLD who presented for an elective L-TKA on 1/17.  Clinically doing well post-operatively.    #S/p L-TKA  #Post-op examination  -- defer wound care and pain mgmt to primary team   -- PT as able     #Essential HTN  -- c/w home ARB-HCTZ    #Diet - Regular  #DVT PPx - defer to primary team; on ASA 81mg BID   #Dispo - home when medically cleared     Bridget Pope  Attending Hospitalist  [phone]

## 2023-01-18 NOTE — PATIENT PROFILE ADULT - FALL HARM RISK - HARM RISK INTERVENTIONS

## 2023-01-18 NOTE — DISCHARGE NOTE PROVIDER - HOSPITAL COURSE
Admit to Orthopaedics on 1/17/23 for left total knee replacement   Perioperative Antibiotics  DVT prophylaxis  Physical Therapy  Pain Management Admit to Orthopaedics on 1/17/23 for left total knee replacement   Perioperative Antibiotics - Ancef  DVT prophylaxis - Aspirin 81mg BID   Physical Therapy - WBAT LLE  Pain Management   Inpatient Events: Stable Admit to Orthopaedics on 1/17/23 for left total knee replacement   Perioperative Antibiotics - Ancef  DVT prophylaxis - Aspirin 81mg BID   Physical Therapy - WBAT LLE  Pain Management

## 2023-01-19 ENCOUNTER — TRANSCRIPTION ENCOUNTER (OUTPATIENT)
Age: 73
End: 2023-01-19

## 2023-01-19 VITALS
HEART RATE: 60 BPM | DIASTOLIC BLOOD PRESSURE: 78 MMHG | OXYGEN SATURATION: 97 % | SYSTOLIC BLOOD PRESSURE: 158 MMHG | TEMPERATURE: 98 F | RESPIRATION RATE: 16 BRPM

## 2023-01-19 LAB
ANION GAP SERPL CALC-SCNC: 8 MMOL/L — SIGNIFICANT CHANGE UP (ref 5–17)
BUN SERPL-MCNC: 36 MG/DL — HIGH (ref 7–23)
CALCIUM SERPL-MCNC: 9 MG/DL — SIGNIFICANT CHANGE UP (ref 8.4–10.5)
CHLORIDE SERPL-SCNC: 103 MMOL/L — SIGNIFICANT CHANGE UP (ref 96–108)
CO2 SERPL-SCNC: 24 MMOL/L — SIGNIFICANT CHANGE UP (ref 22–31)
CREAT SERPL-MCNC: 1.27 MG/DL — SIGNIFICANT CHANGE UP (ref 0.5–1.3)
EGFR: 60 ML/MIN/1.73M2 — SIGNIFICANT CHANGE UP
GLUCOSE SERPL-MCNC: 114 MG/DL — HIGH (ref 70–99)
HCT VFR BLD CALC: 36.6 % — LOW (ref 39–50)
HGB BLD-MCNC: 12 G/DL — LOW (ref 13–17)
MCHC RBC-ENTMCNC: 29.6 PG — SIGNIFICANT CHANGE UP (ref 27–34)
MCHC RBC-ENTMCNC: 32.8 GM/DL — SIGNIFICANT CHANGE UP (ref 32–36)
MCV RBC AUTO: 90.1 FL — SIGNIFICANT CHANGE UP (ref 80–100)
NRBC # BLD: 0 /100 WBCS — SIGNIFICANT CHANGE UP (ref 0–0)
PLATELET # BLD AUTO: 182 K/UL — SIGNIFICANT CHANGE UP (ref 150–400)
POTASSIUM SERPL-MCNC: 4.4 MMOL/L — SIGNIFICANT CHANGE UP (ref 3.5–5.3)
POTASSIUM SERPL-SCNC: 4.4 MMOL/L — SIGNIFICANT CHANGE UP (ref 3.5–5.3)
RBC # BLD: 4.06 M/UL — LOW (ref 4.2–5.8)
RBC # FLD: 13.5 % — SIGNIFICANT CHANGE UP (ref 10.3–14.5)
SODIUM SERPL-SCNC: 135 MMOL/L — SIGNIFICANT CHANGE UP (ref 135–145)
WBC # BLD: 8.02 K/UL — SIGNIFICANT CHANGE UP (ref 3.8–10.5)
WBC # FLD AUTO: 8.02 K/UL — SIGNIFICANT CHANGE UP (ref 3.8–10.5)

## 2023-01-19 PROCEDURE — 80048 BASIC METABOLIC PNL TOTAL CA: CPT

## 2023-01-19 PROCEDURE — S2900: CPT

## 2023-01-19 PROCEDURE — 85027 COMPLETE CBC AUTOMATED: CPT

## 2023-01-19 PROCEDURE — 99232 SBSQ HOSP IP/OBS MODERATE 35: CPT

## 2023-01-19 PROCEDURE — 73560 X-RAY EXAM OF KNEE 1 OR 2: CPT

## 2023-01-19 PROCEDURE — 36415 COLL VENOUS BLD VENIPUNCTURE: CPT

## 2023-01-19 PROCEDURE — C1776: CPT

## 2023-01-19 PROCEDURE — 97116 GAIT TRAINING THERAPY: CPT

## 2023-01-19 PROCEDURE — 97161 PT EVAL LOW COMPLEX 20 MIN: CPT

## 2023-01-19 PROCEDURE — C1713: CPT

## 2023-01-19 PROCEDURE — 86803 HEPATITIS C AB TEST: CPT

## 2023-01-19 RX ORDER — ASPIRIN/CALCIUM CARB/MAGNESIUM 324 MG
1 TABLET ORAL
Qty: 0 | Refills: 0 | DISCHARGE
Start: 2023-01-19

## 2023-01-19 RX ORDER — ASPIRIN/CALCIUM CARB/MAGNESIUM 324 MG
1 TABLET ORAL
Qty: 0 | Refills: 0 | DISCHARGE

## 2023-01-19 RX ORDER — CELECOXIB 200 MG/1
1 CAPSULE ORAL
Qty: 0 | Refills: 0 | DISCHARGE
Start: 2023-01-19

## 2023-01-19 RX ORDER — OXYCODONE HYDROCHLORIDE 5 MG/1
1 TABLET ORAL
Qty: 30 | Refills: 0
Start: 2023-01-19 | End: 2023-01-23

## 2023-01-19 RX ORDER — FAMOTIDINE 10 MG/ML
1 INJECTION INTRAVENOUS
Qty: 0 | Refills: 0 | DISCHARGE
Start: 2023-01-19

## 2023-01-19 RX ORDER — ACETAMINOPHEN 500 MG
3 TABLET ORAL
Qty: 0 | Refills: 0 | DISCHARGE
Start: 2023-01-19

## 2023-01-19 RX ADMIN — CELECOXIB 100 MILLIGRAM(S): 200 CAPSULE ORAL at 06:01

## 2023-01-19 RX ADMIN — Medication 975 MILLIGRAM(S): at 06:01

## 2023-01-19 RX ADMIN — FAMOTIDINE 20 MILLIGRAM(S): 10 INJECTION INTRAVENOUS at 05:01

## 2023-01-19 RX ADMIN — Medication 81 MILLIGRAM(S): at 05:01

## 2023-01-19 RX ADMIN — OXYCODONE HYDROCHLORIDE 5 MILLIGRAM(S): 5 TABLET ORAL at 10:14

## 2023-01-19 RX ADMIN — OXYCODONE HYDROCHLORIDE 5 MILLIGRAM(S): 5 TABLET ORAL at 10:50

## 2023-01-19 RX ADMIN — CELECOXIB 100 MILLIGRAM(S): 200 CAPSULE ORAL at 05:01

## 2023-01-19 RX ADMIN — Medication 1 TABLET(S): at 13:26

## 2023-01-19 RX ADMIN — Medication 975 MILLIGRAM(S): at 13:26

## 2023-01-19 RX ADMIN — Medication 975 MILLIGRAM(S): at 14:00

## 2023-01-19 RX ADMIN — Medication 975 MILLIGRAM(S): at 05:01

## 2023-01-19 RX ADMIN — LOSARTAN POTASSIUM 50 MILLIGRAM(S): 100 TABLET, FILM COATED ORAL at 05:01

## 2023-01-19 NOTE — PROGRESS NOTE ADULT - ASSESSMENT
A/P: 72yMale s/p L TKA on 01/17  - Stable overnight  - Pain/Nausea Control adequate  - Home meds  - AM labs showing elevated Cr -- Monitor for now -- Medicine recs appreciated  - DVT ppx: ASA 81 BID  - WBS: WBAT LLE  - PT: rec'd home w HPT  - Dispo pending PT clearance & medical stability      Ortho Pager [phone]
A/P: 72yMale s/p L TKA on 01/17  - Stable overnight  - Pain/Nausea Control adequate  - Home meds  - AM labs showing improving Cr -- Monitor for now -- Medicine recs appreciated  - DVT ppx: ASA 81 BID  - WBS: WBAT LLE  - PT: rec'd home w HPT  - Dispo pending PT clearance & medical stability      Ortho Pager [phone]
This is a 73yo gentleman with a PMH of OA, essential HTN and HLD who presented for an elective L-TKA on 1/17.  Clinically doing well post-operatively.    #S/p L-TKA  #Post-op examination  -- defer wound care and pain mgmt to primary team   -- PT as able     #Essential HTN  -- c/w home ARB-HCTZ    #Hyponatremia  -- suspect 2/2 poor PO intake over the last 48hrs  -- will improve with home diet  -- can just encourage PO intake for now     #Diet - Regular  #DVT PPx - defer to primary team; on ASA 81mg BID   #Dispo - home when cleared by primary team    Bridget Pope  Attending Hospitalist  [phone]

## 2023-01-19 NOTE — DISCHARGE NOTE NURSING/CASE MANAGEMENT/SOCIAL WORK - PATIENT PORTAL LINK FT
You can access the FollowMyHealth Patient Portal offered by [facility] by registering at the following website: [email or web address]. By joining [employer] [facility]’s FollowMyHealth portal, you will also be able to view your health information using other applications (apps) compatible with our system.

## 2023-01-19 NOTE — DISCHARGE NOTE NURSING/CASE MANAGEMENT/SOCIAL WORK - NSDCPEFALRISK_GEN_ALL_CORE
For information on Fall & Injury Prevention, visit: [email or web address] OR  [email or web address] OR  https://www.cdc.gov/steadi/patient.html

## 2023-01-19 NOTE — PROGRESS NOTE ADULT - SUBJECTIVE AND OBJECTIVE BOX
INTERVAL EVENTS:  -- NAEO    SUBJECTIVE:  -- reports better pain control overnight; did well with PT and hoping to be cleared today  -- voiding and had BM this AM  -- mostly picking at his food; not the most robust appetite but knows he'd be eating more if he were at home  -- Review of Systems: 12 point review of systems otherwise negative    MEDICATIONS:  MEDICATIONS  (STANDING):  acetaminophen     Tablet .. 975 milliGRAM(s) Oral every 8 hours  aspirin enteric coated 81 milliGRAM(s) Oral two times a day  celecoxib 100 milliGRAM(s) Oral every 12 hours  famotidine    Tablet 20 milliGRAM(s) Oral every 12 hours  hydrochlorothiazide 12.5 milliGRAM(s) Oral daily  lactated ringers. 1000 milliLiter(s) (75 mL/Hr) IV Continuous <Continuous>  losartan 50 milliGRAM(s) Oral daily  multivitamin 1 Tablet(s) Oral daily  polyethylene glycol 3350 17 Gram(s) Oral at bedtime  senna 2 Tablet(s) Oral at bedtime    MEDICATIONS  (PRN):  HYDROmorphone  Injectable 0.5 milliGRAM(s) IV Push every 4 hours PRN breakthrough  magnesium hydroxide Suspension 30 milliLiter(s) Oral daily PRN Constipation  ondansetron Injectable 4 milliGRAM(s) IV Push every 6 hours PRN Nausea and/or Vomiting  oxyCODONE    IR 5 milliGRAM(s) Oral every 4 hours PRN Severe Pain (7 - 10)    Allergies  No Known Allergies    OBJECTIVE:  Vital Signs Last 24 Hrs  T(C): 36.6 (19 Jan 2023 08:37), Max: 36.6 (18 Jan 2023 16:43)  T(F): 97.9 (19 Jan 2023 08:37), Max: 97.9 (19 Jan 2023 08:37)  HR: 60 (19 Jan 2023 08:37) (60 - 77)  BP: 158/78 (19 Jan 2023 08:37) (123/71 - 158/78)  BP(mean): --  RR: 16 (19 Jan 2023 08:37) (16 - 16)  SpO2: 97% (19 Jan 2023 08:37) (95% - 98%)    Parameters below as of 19 Jan 2023 08:37  Patient On (Oxygen Delivery Method): room air    I&O's Summary    18 Jan 2023 07:01  -  19 Jan 2023 07:00  --------------------------------------------------------  IN: 240 mL / OUT: 900 mL / NET: -660 mL    PHYSICAL EXAM:  Gen: NAD, sitting upright in bed  HEENT: NCAT, MMM, clear OP  Neck: supple, trachea at midline  CV: RRR, no m/g/r appreciated  Pulm: CTA B, no w/r/r; no increase in WOB  Abd: normoactive BS, soft, NTND  Ext: WWP, 2+ pulses x4; no c/c/e; L-knee +cooling brace   Neuro: AOx3, nonfocal  Psych: pleasant, conversant and appropriate    LABS:                        12.0   8.02  )-----------( 182      ( 19 Jan 2023 10:58 )             36.6     01-18    135  |  106  |  33<H>  ----------------------------<  133<H>  4.9   |  22  |  1.35<H>    Ca    8.5      18 Jan 2023 05:30    MICRODATA:  -- No new microdata.    RADIOLOGY/OTHER STUDIES:  -- No new imaging.
Ortho Post Op Check    Procedure:  L TKA   Surgeon: Dr. TRUPTI Bautista    Pt comfortable without complaints, pain controlled. Endorses occasional L knee discomfort   Denies CP, SOB, N/V, numbness/tingling     Vital Signs Last 24 Hrs  T(C): 36.4 (01-18-23 @ 00:48), Max: 36.4 (01-18-23 @ 00:48)  T(F): 97.6 (01-18-23 @ 00:48), Max: 97.6 (01-18-23 @ 00:48)  HR: 73 (01-18-23 @ 00:48) (73 - 73)  BP: 121/75 (01-18-23 @ 00:48) (121/75 - 121/75)  BP(mean): --  RR: 18 (01-18-23 @ 00:48) (18 - 18)  SpO2: 95% (01-18-23 @ 00:48) (95% - 95%)    General: Pt Alert and oriented, NAD  L knee DSG C/D/I  Pulses: 2+ DP  Sensation: SILT grossly SPN/DPN/Saph/Martha/Tib  Motor: 5/5 TA/GS/EHL, Quad/Psoas firing limited 2/2 pain    Post-op X-Ray: hardware intact w/o fracture    A/P: 72yMale s/p L TKA by Dr. TRUPTI Bautista on 01-18  - Stable  - Pain Control  - DVT ppx: ASA   - Post op abx: Ancef  - WBS: WBAT  - PT eval - HPT     Ortho Pager [phone]
Ortho Note    Pt comfortable without complaints, pain controlled  Denies CP, SOB, N/V, numbness/tingling     **Patient seen and examined earlier this AM**    Vital Signs Last 24 Hrs  T(C): 36.6 (19 Jan 2023 08:37), Max: 36.6 (19 Jan 2023 05:10)  T(F): 97.9 (19 Jan 2023 08:37), Max: 97.9 (19 Jan 2023 08:37)  HR: 60 (19 Jan 2023 08:37) (60 - 73)  BP: 158/78 (19 Jan 2023 08:37) (154/86 - 158/78)  BP(mean): --  RR: 16 (19 Jan 2023 08:37) (16 - 16)  SpO2: 97% (19 Jan 2023 08:37) (97% - 98%)    Parameters below as of 19 Jan 2023 08:37  Patient On (Oxygen Delivery Method): room air            VSS  General: A&Ox3, NAD  LLE: Gauze/ Abd DSG C/D/I; Overlying Ace wrap CDI  Pulses: Foot WWP; DP pulse 2+; Cap refill < 2 sec  Sensation: SILT distally and symmetric to contralateral extremity  Motor: TA/EHL/FHL/GS 5/5 and symmetric to contralateral extremity      Labs:                        12.0   8.02  )-----------( 182      ( 19 Jan 2023 10:58 )             36.6       01-19    135  |  103  |  36<H>  ----------------------------<  114<H>  4.4   |  24  |  1.27    Ca    9.0      19 Jan 2023 10:58                                  
Ortho Note    Pt comfortable without complaints, pain controlled  Denies CP, SOB, N/V, numbness/tingling     Vital Signs Last 24 Hrs  T(C): 36.6 (01-19-23 @ 08:37), Max: 36.6 (01-19-23 @ 08:37)  T(F): 97.9 (01-19-23 @ 08:37), Max: 97.9 (01-19-23 @ 08:37)  HR: 60 (01-19-23 @ 08:37) (60 - 60)  BP: 158/78 (01-19-23 @ 08:37) (158/78 - 158/78)  BP(mean): --  RR: 16 (01-19-23 @ 08:37) (16 - 16)  SpO2: 97% (01-19-23 @ 08:37) (97% - 97%)  I&O's Summary    18 Jan 2023 07:01  -  19 Jan 2023 07:00  --------------------------------------------------------  IN: 240 mL / OUT: 900 mL / NET: -660 mL        General: Pt Alert and oriented, NAD  DSG C/D/I left knee with ACE  Pulses intact LLE  Sensation intact LLE  Motor: EHL/FHL/TA/GS 5/5 LLE                          12.0   8.02  )-----------( 182      ( 19 Jan 2023 10:58 )             36.6     01-19    135  |  103  |  36<H>  ----------------------------<  114<H>  4.4   |  24  |  1.27    Ca    9.0      19 Jan 2023 10:58        A/P: 72yMale POD#2 s/p Left TKR  - Stable  - Pain Control  - DVT ppx: asa  - PT, WBS: wbat  - d/c home today    Ortho Pager [phone]
Orthopaedic Surgery Progress Note    Patient seen and examined. DEVIN. Patient without complaints. Pain controlled. Denies CP, SOB, N/V, tactile fevers, calf pain.  Patient is POD #1 s/p left total knee replacement.     Vital Signs Last 24 Hrs  T(C): 36.3 (18 Jan 2023 08:45), Max: 36.6 (17 Jan 2023 15:59)  T(F): 97.4 (18 Jan 2023 08:45), Max: 97.8 (17 Jan 2023 15:59)  HR: 66 (18 Jan 2023 08:45) (52 - 76)  BP: 110/56 (18 Jan 2023 08:45) (110/56 - 158/77)  BP(mean): 93 (17 Jan 2023 13:40) (87 - 107)  RR: 16 (18 Jan 2023 08:45) (14 - 18)  SpO2: 99% (18 Jan 2023 08:45) (95% - 100%)    Parameters below as of 18 Jan 2023 08:45  Patient On (Oxygen Delivery Method): room air    Physical Exam:  Pt laying comfortably in bed, NAD.  Skin warm and well perfused, no visible erythema/ecchymoses.  Dressing C/D/I  EHL/FHL/TA/GS 5/5 motor strength bilateral lower extremities  Calves soft and nontender to palpation  SLT in tact to distal bilateral lower extremities  DP pulses palpable bilaterally     LABS                        10.9   9.34  )-----------( 182      ( 18 Jan 2023 05:30 )             33.8                                01-18    135  |  106  |  33<H>  ----------------------------<  133<H>  4.9   |  22  |  1.35<H>    Pre op Cr 1.2     Ca    8.5      18 Jan 2023 05:30      A/P: 72M POD #1 s/p left total knee replacement     CONTINUE:        1. PT: WBAT, pending clearance   2. DVT prophylaxis: ASA 81 BID , SCDs   3. Pain Control as needed   4. Dispo: Home pending PT clearance       
Ortho Note    Pt comfortable without complaints, pain controlled  Denies CP, SOB, N/V, numbness/tingling     **Patient seen and examined earlier this AM**    Vital Signs Last 24 Hrs  T(C): 36.3 (18 Jan 2023 08:45), Max: 36.4 (18 Jan 2023 00:48)  T(F): 97.4 (18 Jan 2023 08:45), Max: 97.6 (18 Jan 2023 00:48)  HR: 66 (18 Jan 2023 08:45) (66 - 76)  BP: 110/56 (18 Jan 2023 08:45) (110/56 - 126/76)  BP(mean): --  RR: 16 (18 Jan 2023 08:45) (16 - 18)  SpO2: 99% (18 Jan 2023 08:45) (95% - 99%)    Parameters below as of 18 Jan 2023 08:45  Patient On (Oxygen Delivery Method): room air          VSS  General: A&Ox3, NAD  LLE: Gauze/ Abd DSG C/D/I; Overlying Ace wrap CDI  Pulses: Foot WWP; DP pulse 2+; Cap refill < 2 sec  Sensation: SILT distally and symmetric to contralateral extremity  Motor: TA/EHL/FHL/GS 5/5 and symmetric to contralateral extremity      Labs:                        10.9   9.34  )-----------( 182      ( 18 Jan 2023 05:30 )             33.8       01-18    135  |  106  |  33<H>  ----------------------------<  133<H>  4.9   |  22  |  1.35<H>    Ca    8.5      18 Jan 2023 05:30

## 2023-01-20 ENCOUNTER — TRANSCRIPTION ENCOUNTER (OUTPATIENT)
Age: 73
End: 2023-01-20

## 2023-01-23 DIAGNOSIS — Z96.641 PRESENCE OF RIGHT ARTIFICIAL HIP JOINT: ICD-10-CM

## 2023-01-23 DIAGNOSIS — M17.12 UNILATERAL PRIMARY OSTEOARTHRITIS, LEFT KNEE: ICD-10-CM

## 2023-01-23 DIAGNOSIS — E87.1 HYPO-OSMOLALITY AND HYPONATREMIA: ICD-10-CM

## 2023-01-23 DIAGNOSIS — E78.5 HYPERLIPIDEMIA, UNSPECIFIED: ICD-10-CM

## 2023-01-23 DIAGNOSIS — I10 ESSENTIAL (PRIMARY) HYPERTENSION: ICD-10-CM

## 2023-01-26 ENCOUNTER — NON-APPOINTMENT (OUTPATIENT)
Age: 73
End: 2023-01-26

## 2023-01-30 ENCOUNTER — TRANSCRIPTION ENCOUNTER (OUTPATIENT)
Age: 73
End: 2023-01-30

## 2023-02-06 ENCOUNTER — OUTPATIENT (OUTPATIENT)
Dept: OUTPATIENT SERVICES | Facility: HOSPITAL | Age: 73
LOS: 1 days | End: 2023-02-06
Payer: MEDICARE

## 2023-02-06 ENCOUNTER — RESULT REVIEW (OUTPATIENT)
Age: 73
End: 2023-02-06

## 2023-02-06 ENCOUNTER — APPOINTMENT (OUTPATIENT)
Dept: ORTHOPEDIC SURGERY | Facility: CLINIC | Age: 73
End: 2023-02-06
Payer: MEDICARE

## 2023-02-06 VITALS
HEART RATE: 68 BPM | OXYGEN SATURATION: 98 % | DIASTOLIC BLOOD PRESSURE: 80 MMHG | BODY MASS INDEX: 36.54 KG/M2 | WEIGHT: 214 LBS | HEIGHT: 64 IN | SYSTOLIC BLOOD PRESSURE: 121 MMHG

## 2023-02-06 DIAGNOSIS — Z98.890 OTHER SPECIFIED POSTPROCEDURAL STATES: Chronic | ICD-10-CM

## 2023-02-06 DIAGNOSIS — Z96.641 PRESENCE OF RIGHT ARTIFICIAL HIP JOINT: Chronic | ICD-10-CM

## 2023-02-06 PROBLEM — I10 ESSENTIAL (PRIMARY) HYPERTENSION: Chronic | Status: ACTIVE | Noted: 2023-01-16

## 2023-02-06 PROBLEM — E78.5 HYPERLIPIDEMIA, UNSPECIFIED: Chronic | Status: ACTIVE | Noted: 2023-01-16

## 2023-02-06 PROBLEM — M17.12 UNILATERAL PRIMARY OSTEOARTHRITIS, LEFT KNEE: Chronic | Status: ACTIVE | Noted: 2023-01-13

## 2023-02-06 PROCEDURE — 73562 X-RAY EXAM OF KNEE 3: CPT

## 2023-02-06 PROCEDURE — 99024 POSTOP FOLLOW-UP VISIT: CPT

## 2023-02-06 PROCEDURE — 73562 X-RAY EXAM OF KNEE 3: CPT | Mod: 26,LT

## 2023-02-06 NOTE — DISCUSSION/SUMMARY
[de-identified] : 3 weeks S/P left total knee replacement, progressing well.  Symptomatic and pain relief, range of motion, activity advancement and precaution strategies reviewed, including use of over-the-counter medications as needed.  We provided information regarding the need for antibiotic prophylaxis for future dental or invasive procedures. We will follow up as scheduled in 8 weeks, but advised them to return sooner if they develops any concerns for an evaluation.

## 2023-02-06 NOTE — HISTORY OF PRESENT ILLNESS
[de-identified] : YOAN HERNANDEZ is known to me for s/p left knee replacement on date of surgery: 1/24/23\par Since discharge from hospital, he is improving. He reports pain is well managed and is taking Tylenol 1,000mg every 8 hours. He  is taking Aspirin 81mg twice a day and has found that Diclofenac 75mg BID has provided better pain control than Meloxicam 7.5mg daily. He is icing as directed and completed home PT. He is using a walking stick to ambulate.  Overall he feels like he is improving daily.  Denies any fevers and chills or other signs of infection.\par

## 2023-02-06 NOTE — PHYSICAL EXAM
[de-identified] : General: AxO x 3\par \par Neuro: 5/5 strength with foot eversion, foot inversion, dorsiflexion, plantar flexion, sensation is intact over the lower extremity in L2-S1 nerve distributions. 2+ dorsalis pedis and posterior tibial pulses. Negative Maria’s sign\par \par Skin: incision healing well, appropriate erythema, no signs of infection, pin incision sutures removed today\par \par Knee: Appropriate post-operative swelling. ROM: 0-100 No AP or VV instability\par \par  [de-identified] : 3 views of the left knee are reviewed.  There is no evidence for any hardware complication loosening fracture or dislocation.

## 2023-03-07 ENCOUNTER — NON-APPOINTMENT (OUTPATIENT)
Age: 73
End: 2023-03-07

## 2023-04-03 ENCOUNTER — OUTPATIENT (OUTPATIENT)
Dept: OUTPATIENT SERVICES | Facility: HOSPITAL | Age: 73
LOS: 1 days | End: 2023-04-03
Payer: MEDICARE

## 2023-04-03 ENCOUNTER — APPOINTMENT (OUTPATIENT)
Dept: ORTHOPEDIC SURGERY | Facility: CLINIC | Age: 73
End: 2023-04-03
Payer: MEDICARE

## 2023-04-03 ENCOUNTER — RESULT REVIEW (OUTPATIENT)
Age: 73
End: 2023-04-03

## 2023-04-03 ENCOUNTER — NON-APPOINTMENT (OUTPATIENT)
Age: 73
End: 2023-04-03

## 2023-04-03 VITALS
HEIGHT: 64 IN | HEART RATE: 67 BPM | BODY MASS INDEX: 36.54 KG/M2 | WEIGHT: 214 LBS | DIASTOLIC BLOOD PRESSURE: 75 MMHG | OXYGEN SATURATION: 99 % | SYSTOLIC BLOOD PRESSURE: 131 MMHG

## 2023-04-03 DIAGNOSIS — Z98.890 OTHER SPECIFIED POSTPROCEDURAL STATES: Chronic | ICD-10-CM

## 2023-04-03 DIAGNOSIS — Z96.652 PRESENCE OF LEFT ARTIFICIAL KNEE JOINT: ICD-10-CM

## 2023-04-03 DIAGNOSIS — Z96.641 PRESENCE OF RIGHT ARTIFICIAL HIP JOINT: Chronic | ICD-10-CM

## 2023-04-03 PROCEDURE — 99024 POSTOP FOLLOW-UP VISIT: CPT

## 2023-04-03 PROCEDURE — 73610 X-RAY EXAM OF ANKLE: CPT | Mod: 26,50

## 2023-04-03 PROCEDURE — 73562 X-RAY EXAM OF KNEE 3: CPT | Mod: 26,50

## 2023-04-03 PROCEDURE — 73562 X-RAY EXAM OF KNEE 3: CPT

## 2023-04-03 PROCEDURE — 73610 X-RAY EXAM OF ANKLE: CPT

## 2023-04-03 NOTE — HISTORY OF PRESENT ILLNESS
[de-identified] : Patient is 2 and half month status post left total knee arthroplasty.  In general he feels very well and is very pleased with the result.  The only slight difficulty has is with stairs at times.  He is not using any assistive devices or taking any significant pain meds.  No fevers chills night sweats nausea vomiting chest pain or shortness of breath.  He continues with outpatient PT.  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  He did have some discomfort in the evening after the most recent event after being on his feet for 3 hours. [de-identified] : On exam he is alert and oriented.  His incision is well-healed as are the distal pin site incisions.  His range of motion is from 0 to 115 degrees.  There is no instability varus valgus and posterior stability testing of the knee. [de-identified] : 3 views of the left knee are reviewed.  There is no evidence for any hardware complication loosening fracture or dislocation. [de-identified] : 2.5 months S/P left total knee replacement, progressing well.  Symptomatic and pain relief, range of motion, activity advancement and precaution strategies reviewed, including use of over-the-counter medications as needed.  We talked about the importance of activity modifications and not overdoing at this stage with his work and he understands.  We provided information regarding the need for antibiotic prophylaxis for future dental or invasive procedures. We will follow up as scheduled, but advised them to return sooner if they develops any concerns for an evaluation.

## 2023-04-06 DIAGNOSIS — M22.2X1 PATELLOFEMORAL DISORDERS, RIGHT KNEE: ICD-10-CM

## 2023-04-06 DIAGNOSIS — M25.861 OTHER SPECIFIED JOINT DISORDERS, RIGHT KNEE: ICD-10-CM

## 2023-04-06 DIAGNOSIS — M25.572 PAIN IN LEFT ANKLE AND JOINTS OF LEFT FOOT: ICD-10-CM

## 2023-04-06 DIAGNOSIS — M25.761 OSTEOPHYTE, RIGHT KNEE: ICD-10-CM

## 2023-04-06 DIAGNOSIS — M25.571 PAIN IN RIGHT ANKLE AND JOINTS OF RIGHT FOOT: ICD-10-CM

## 2023-04-06 DIAGNOSIS — M77.31 CALCANEAL SPUR, RIGHT FOOT: ICD-10-CM

## 2023-04-06 DIAGNOSIS — Z96.652 PRESENCE OF LEFT ARTIFICIAL KNEE JOINT: ICD-10-CM

## 2023-04-06 DIAGNOSIS — M77.32 CALCANEAL SPUR, LEFT FOOT: ICD-10-CM

## 2023-06-05 ENCOUNTER — APPOINTMENT (OUTPATIENT)
Dept: ORTHOPEDIC SURGERY | Facility: CLINIC | Age: 73
End: 2023-06-05

## 2023-07-19 ENCOUNTER — NON-APPOINTMENT (OUTPATIENT)
Age: 73
End: 2023-07-19

## 2023-07-21 ENCOUNTER — APPOINTMENT (OUTPATIENT)
Dept: CARDIOLOGY | Facility: CLINIC | Age: 73
End: 2023-07-21
Payer: MEDICARE

## 2023-07-21 VITALS
HEIGHT: 64 IN | DIASTOLIC BLOOD PRESSURE: 72 MMHG | BODY MASS INDEX: 35.85 KG/M2 | HEART RATE: 64 BPM | WEIGHT: 210 LBS | SYSTOLIC BLOOD PRESSURE: 124 MMHG

## 2023-07-21 DIAGNOSIS — Z01.810 ENCOUNTER FOR PREPROCEDURAL CARDIOVASCULAR EXAMINATION: ICD-10-CM

## 2023-07-21 PROCEDURE — 99214 OFFICE O/P EST MOD 30 MIN: CPT

## 2023-07-21 PROCEDURE — 93000 ELECTROCARDIOGRAM COMPLETE: CPT

## 2023-07-21 NOTE — DISCUSSION/SUMMARY
[FreeTextEntry1] : Cont Olmesartan-HCTZ.\par Weight loss exercise (improtance discussed)\par Regular PMD follow-up / labs\par Monitor lipids\par Follow-up 1-year / as needed

## 2023-07-21 NOTE — PHYSICAL EXAM
[de-identified] : well appearing, overweight, no distress [de-identified] : reg, nL s1/s2, no m/r/g [de-identified] : CTA [de-identified] : alert, normal affect, logical conversation

## 2023-07-21 NOTE — ASSESSMENT
[FreeTextEntry1] : Mild exertional dyspnea (improved / likely related to deconditioning and knee pain).\par Reassuring Stress ECHO\par \par Trace valvular regurgitation (AI / MR).\par \par BP controlled.

## 2023-07-21 NOTE — REASON FOR VISIT
[FreeTextEntry1] : Feels well.\par \par Underwent knee replacement.  Breathing improved when pain improved.\par \par No interval cardiac symptoms.\par \par Tolerating Rx.\par \par Weight stable.\par \par Labs Reviewed (10/2022):\par LDL mildly elevated.\par A2c 6.0\par CBC / CMP unremarkable

## 2023-10-02 DIAGNOSIS — M17.11 UNILATERAL PRIMARY OSTEOARTHRITIS, RIGHT KNEE: ICD-10-CM

## 2023-10-02 RX ORDER — OXYCODONE 5 MG/1
5 TABLET ORAL
Qty: 30 | Refills: 0 | Status: DISCONTINUED | COMMUNITY
Start: 2023-01-19 | End: 2023-10-02

## 2023-10-02 RX ORDER — DICLOFENAC SODIUM 75 MG/1
75 TABLET, DELAYED RELEASE ORAL
Qty: 60 | Refills: 1 | Status: ACTIVE | COMMUNITY
Start: 2023-10-02 | End: 1900-01-01

## 2024-01-12 ENCOUNTER — APPOINTMENT (OUTPATIENT)
Dept: CARDIOLOGY | Facility: CLINIC | Age: 74
End: 2024-01-12
Payer: MEDICARE

## 2024-01-12 ENCOUNTER — OUTPATIENT (OUTPATIENT)
Dept: OUTPATIENT SERVICES | Facility: HOSPITAL | Age: 74
LOS: 1 days | Discharge: HOME | End: 2024-01-12
Payer: MEDICARE

## 2024-01-12 ENCOUNTER — APPOINTMENT (OUTPATIENT)
Dept: SLEEP CENTER | Facility: HOSPITAL | Age: 74
End: 2024-01-12
Payer: MEDICARE

## 2024-01-12 VITALS
WEIGHT: 218 LBS | HEIGHT: 64 IN | BODY MASS INDEX: 37.22 KG/M2 | DIASTOLIC BLOOD PRESSURE: 64 MMHG | HEART RATE: 66 BPM | SYSTOLIC BLOOD PRESSURE: 132 MMHG

## 2024-01-12 DIAGNOSIS — Z96.641 PRESENCE OF RIGHT ARTIFICIAL HIP JOINT: Chronic | ICD-10-CM

## 2024-01-12 DIAGNOSIS — Z98.890 OTHER SPECIFIED POSTPROCEDURAL STATES: Chronic | ICD-10-CM

## 2024-01-12 PROCEDURE — 95800 SLP STDY UNATTENDED: CPT | Mod: 26

## 2024-01-12 PROCEDURE — 95806 SLEEP STUDY UNATT&RESP EFFT: CPT

## 2024-01-12 PROCEDURE — 99214 OFFICE O/P EST MOD 30 MIN: CPT

## 2024-01-12 PROCEDURE — 93000 ELECTROCARDIOGRAM COMPLETE: CPT

## 2024-01-12 NOTE — REASON FOR VISIT
[FreeTextEntry1] : New exertional dyspnea.  Notices when walking his dog.  More winded and slightly fatigued when he finishes.  Does not actually have to stop to catch his breath.  No chest pain.  No lightheadedness.  Notably, he does have knee pain when he walks and wonders if this contributes.  No recent URI or viral syndrome.  Dr. Rose obtained a chest x-ray that was normal.    Just did home sleep study.  Otherwise, feels well.  Tolerating Rx.  Gained 8-lbs.  Labs reviewed (1/2024): Creatinine 1.4 A1c 6.1 , HDL 85, triglycerides 87 CBC, CMP otherwise unremarkable

## 2024-01-12 NOTE — PHYSICAL EXAM
[de-identified] : well appearing, overweight, no distress [de-identified] : reg, nL s1/s2, no m/r/g [de-identified] : CTA [de-identified] : alert, normal affect, logical conversation

## 2024-01-12 NOTE — DISCUSSION/SUMMARY
[FreeTextEntry1] : Exercise nuclear stress test Cont Olmesartan-HCTZ. Weight loss / exercise Regular PMD follow-up / labs Follow-up 3 months [EKG obtained to assist in diagnosis and management of assessed problem(s)] : EKG obtained to assist in diagnosis and management of assessed problem(s)

## 2024-01-12 NOTE — ASSESSMENT
[FreeTextEntry1] : Senior male with cardiac risk factors. Exertional dyspnea (possible anginal equivalent). Obesity, deconditioning, knee pain are also potential contributors.  Trace valvular regurgitation (AI / MR).  BP controlled.

## 2024-01-16 DIAGNOSIS — G47.33 OBSTRUCTIVE SLEEP APNEA (ADULT) (PEDIATRIC): ICD-10-CM

## 2024-01-17 DIAGNOSIS — G47.33 OBSTRUCTIVE SLEEP APNEA (ADULT) (PEDIATRIC): ICD-10-CM

## 2024-01-19 ENCOUNTER — RESULT REVIEW (OUTPATIENT)
Age: 74
End: 2024-01-19

## 2024-01-19 ENCOUNTER — OUTPATIENT (OUTPATIENT)
Dept: OUTPATIENT SERVICES | Facility: HOSPITAL | Age: 74
LOS: 1 days | End: 2024-01-19
Payer: MEDICARE

## 2024-01-19 DIAGNOSIS — Z00.8 ENCOUNTER FOR OTHER GENERAL EXAMINATION: ICD-10-CM

## 2024-01-19 DIAGNOSIS — R06.00 DYSPNEA, UNSPECIFIED: ICD-10-CM

## 2024-01-19 DIAGNOSIS — Z96.641 PRESENCE OF RIGHT ARTIFICIAL HIP JOINT: Chronic | ICD-10-CM

## 2024-01-19 DIAGNOSIS — Z98.890 OTHER SPECIFIED POSTPROCEDURAL STATES: Chronic | ICD-10-CM

## 2024-01-19 PROCEDURE — 93018 CV STRESS TEST I&R ONLY: CPT

## 2024-01-19 PROCEDURE — 78452 HT MUSCLE IMAGE SPECT MULT: CPT | Mod: 26,MH

## 2024-01-19 PROCEDURE — 78452 HT MUSCLE IMAGE SPECT MULT: CPT | Mod: MH

## 2024-01-20 DIAGNOSIS — R06.00 DYSPNEA, UNSPECIFIED: ICD-10-CM

## 2024-03-19 ENCOUNTER — APPOINTMENT (OUTPATIENT)
Dept: PULMONOLOGY | Facility: CLINIC | Age: 74
End: 2024-03-19
Payer: MEDICARE

## 2024-03-19 VITALS
BODY MASS INDEX: 35.85 KG/M2 | DIASTOLIC BLOOD PRESSURE: 80 MMHG | WEIGHT: 210 LBS | SYSTOLIC BLOOD PRESSURE: 130 MMHG | OXYGEN SATURATION: 98 % | HEIGHT: 64 IN | HEART RATE: 77 BPM

## 2024-03-19 DIAGNOSIS — G47.10 HYPERSOMNIA, UNSPECIFIED: ICD-10-CM

## 2024-03-19 DIAGNOSIS — G47.33 OBSTRUCTIVE SLEEP APNEA (ADULT) (PEDIATRIC): ICD-10-CM

## 2024-03-19 DIAGNOSIS — G47.30 HYPERSOMNIA, UNSPECIFIED: ICD-10-CM

## 2024-03-19 DIAGNOSIS — E66.01 MORBID (SEVERE) OBESITY DUE TO EXCESS CALORIES: ICD-10-CM

## 2024-03-19 PROCEDURE — 99203 OFFICE O/P NEW LOW 30 MIN: CPT

## 2024-03-19 NOTE — PHYSICAL EXAM
[No Acute Distress] : no acute distress [IV] : Mallampati Class: IV [Enlarged Base of the Tongue] : enlarged base of the tongue [Normal Appearance] : normal appearance [Normal Rate/Rhythm] : normal rate/rhythm [No Neck Mass] : no neck mass [Normal S1, S2] : normal s1, s2 [No Murmurs] : no murmurs [Clear to Auscultation Bilaterally] : clear to auscultation bilaterally [No Resp Distress] : no resp distress [No Abnormalities] : no abnormalities [Benign] : benign [No Clubbing] : no clubbing [Normal Gait] : normal gait [No Cyanosis] : no cyanosis [No Edema] : no edema [FROM] : FROM [Normal Color/ Pigmentation] : normal color/ pigmentation [No Focal Deficits] : no focal deficits [Oriented x3] : oriented x3 [Normal Affect] : normal affect

## 2024-03-19 NOTE — ASSESSMENT
[FreeTextEntry1] : Assessment: CORY Obesity EDS controlled   PLAN: The patient needs the PAP device. I will arrange for an auto titrating CPAP unit. New supplies will be ordered  Weight loss discussed. I stressed the need maintain compliance with the PAP device. The patient is not to use an Ozone or UV sterilizer. F/U in 3months  I reviewed the entire case with the patient's wife who was in attendance today.

## 2024-05-30 ENCOUNTER — APPOINTMENT (OUTPATIENT)
Dept: UROLOGY | Facility: CLINIC | Age: 74
End: 2024-05-30
Payer: MEDICARE

## 2024-05-30 VITALS
DIASTOLIC BLOOD PRESSURE: 86 MMHG | WEIGHT: 220 LBS | BODY MASS INDEX: 37.56 KG/M2 | SYSTOLIC BLOOD PRESSURE: 133 MMHG | HEIGHT: 64 IN

## 2024-05-30 DIAGNOSIS — E29.1 TESTICULAR HYPOFUNCTION: ICD-10-CM

## 2024-05-30 PROCEDURE — G2211 COMPLEX E/M VISIT ADD ON: CPT

## 2024-05-30 PROCEDURE — 99204 OFFICE O/P NEW MOD 45 MIN: CPT

## 2024-05-30 NOTE — HISTORY OF PRESENT ILLNESS
[FreeTextEntry1] : Language: English Accompanied by: Self Contact info: [phone] Referring Provider/PCP: Rose   Initial H&P 05/30/2024: Mr. HERNANDEZ is a very pleasant 74 year-old gentleman who presents today for initial evaluation of low T.   C/o decreased libido.  Had T checked by Dr. Rose, which came back at 187.  Endorses weaker erections. Was dx with PD 2-3y ago.  Never treated it.  Was going to do Xiaflex, but never followed up due to pandemic.  Was told he has a 30 degree angle.  Rates firmness at 2/4. Curves up and to the left.  Hasn't had sex in a long time to determine the degree of bother.  Doesn't take meds for erections.   Denies decreased energy levels, mental effects of low T.   Endorses occ frequency/urgency (based on volume and type of fluid intake), mildly weak stream.  Denies nocturia, GH, straining, intermittency, incontinence, UTIs, dysuria, PVD, sensation of incomplete emptying.   No prior prostate surgery or treatments.  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: No malignancies   All: NKDA --------------------------------------------------------------------------------------------------------------------------------------- Physical Exam: General: NAD, sitting on exam table comfortably HEENT: NCAT, EOMI Resp: breathing comfortably on RA, b/l symm chest rise Cardiac: RRR Abd: SNTND Back: (-) CVAT b/l MSK: ZIA davsi/ FROM Psych: appropriate affect  --------------------------------------------------------------------------------------------------------------------------------------- Results: PSA 09/23/2023: 0.70  Testosterone 05/15/2024: 187 --------------------------------------------------------------------------------------------------------------------------------------- A/P: 74M with hypogonadism, Peyronie's disease and Erectile Dysfunction.   1. Hypogonadism We had a long discussion today about his symptoms and how they are related to low testosterone. I explained that in order to diagnose low T, he requires 2 AM levels, both of which need to be <300.  In addition to Total T, he will need additional labwork, including Estradiol levels, SHBG (used to calculate free T), and Prolactin.  We discussed that testosterone therapy may result in improvements in erectile function, low sex drive, anemia, bone mineral density, lean body mass, and/or depressive symptoms; however evidence is inconclusive whether testosterone therapy improves cognitive function, measures of diabetes, energy, fatigue, lipid profiles, and quality of life measures Final treatment recommendations will be based on results of bloodwork, however we discussed the various treatment options today, including TRT with androgel (or alternative), injections, SERMs and AIs.  Between TRT and SERMs/AIs, I explained that I generally recommend the non-TRT options to men who are still interested in preserving fertility, however it is still an option for all age groups. We also discussed that SERMs and AIs are used off-label to treat hypogonadism, which specifically means that it is not an FDA approved indication.  AEs of all treatment options were discussed, including but not limited to decreased sperm count, VTE/cardiovascular events (FDA warning only), changes in mood.  We discussed that per AUA guidelines, there is no evidence linking testosterone therapy to the development of prostate cancer, nor any evidence linking it to a higher rate of venothrombolic events.  Additionally, we discussed that it cannot be stated definitively whether testosterone therapy increases or decreases the risk of cardiovascular events (e.g., myocardial infarction, stroke, cardiovascular-related death, all-cause mortality).  Lastly, we discussed that low testosterone is a risk factor for cardiovascular disease, and therefore if his mood does not improve after reaching the middle tertile of 450-600 (treatment goal), he may still benefit from treatment from a cardiovascular health standpoint.   Following initiation of treatment, he understands that he will need a repeat total T to ensure therapeutic levels, and once he is on an appropriate regimen, he will need to undergo repeat BW q3 months in order to continue therapy.   A repeat Testosterone was obtained today, and I will call to discuss results and next steps based on the above recommendations.   2. Peyronie's Disease Discussed that treatment would be based on several factors, including baseline erectile function as well as degree of bother when sexually active.  Because he is currently not sexually active, I recommended we address the underlying hypogonadism first, and we will discuss his PD at f/u visit.   3. ED Explained that his ED may be multifactorial. Underlying causes in his situation may be due to his PD, Low T, and age-related vascular changes. We will reassess erectile function after he is eugonadal.  The treatment options for his ED may also be determined by his ultimate treatment for his PD.    I have answered all of his questions, and I will see him on f/u after initiation of treatment for his low T, or sooner PRN.   Plan: - f/u repeat BW obtained today - Pt would like to try Androgel if appropriate based on repeat labs - repeat BW after initiation of treatment (6 weeks if starting androgel) - RTC after repeat BW  I spent a total of 52 minutes on face-to-face counseling, coordination of care, review of prior records and clinical documentation.

## 2024-05-31 ENCOUNTER — APPOINTMENT (OUTPATIENT)
Dept: CARDIOLOGY | Facility: CLINIC | Age: 74
End: 2024-05-31
Payer: MEDICARE

## 2024-05-31 VITALS
BODY MASS INDEX: 37.56 KG/M2 | HEART RATE: 77 BPM | WEIGHT: 220 LBS | DIASTOLIC BLOOD PRESSURE: 70 MMHG | SYSTOLIC BLOOD PRESSURE: 114 MMHG | HEIGHT: 64 IN

## 2024-05-31 DIAGNOSIS — I34.0 NONRHEUMATIC MITRAL (VALVE) INSUFFICIENCY: ICD-10-CM

## 2024-05-31 DIAGNOSIS — E78.5 HYPERLIPIDEMIA, UNSPECIFIED: ICD-10-CM

## 2024-05-31 DIAGNOSIS — I35.1 NONRHEUMATIC AORTIC (VALVE) INSUFFICIENCY: ICD-10-CM

## 2024-05-31 DIAGNOSIS — I10 ESSENTIAL (PRIMARY) HYPERTENSION: ICD-10-CM

## 2024-05-31 DIAGNOSIS — R06.00 DYSPNEA, UNSPECIFIED: ICD-10-CM

## 2024-05-31 PROCEDURE — 99214 OFFICE O/P EST MOD 30 MIN: CPT

## 2024-05-31 PROCEDURE — 93000 ELECTROCARDIOGRAM COMPLETE: CPT

## 2024-05-31 NOTE — ASSESSMENT
[FreeTextEntry1] : Mild exertional dyspnea. Likely related to weight / deconditioning.  Trace valvular regurgitation (AI / MR).  BP controlled.

## 2024-05-31 NOTE — REASON FOR VISIT
[FreeTextEntry1] : Stable mild exertional dyspnea.  Notices correlation with weight gain after knee replacement.  Overall, remains very active (housework, walks dogs).  No interval cardiac symptoms.  Exercise nuclear stress test (1/2024): 6.5 minutes (88%) hypertensive response.  Normal EKG.  Diaphragmatic attenuation.  Normal MPI and LV function.

## 2024-05-31 NOTE — DISCUSSION/SUMMARY
[FreeTextEntry1] : Cont Olmesartan-HCTZ. Weight loss / exercise Regular PMD follow-up / labs Follow-up 1-year / as needed [EKG obtained to assist in diagnosis and management of assessed problem(s)] : EKG obtained to assist in diagnosis and management of assessed problem(s)

## 2024-06-20 LAB
ESTROGEN SERPL-MCNC: 163 PG/ML
PROLACTIN SERPL-MCNC: 9.2 NG/ML
SHBG-ESOTERIX: 52.4 NMOL/L
TESTOST SERPL-MCNC: 197 NG/DL

## 2024-06-20 RX ORDER — TESTOSTERONE 16.2 MG/G
20.25 MG/ACT GEL TRANSDERMAL
Qty: 1 | Refills: 0 | Status: ACTIVE | COMMUNITY
Start: 2024-06-20 | End: 1900-01-01

## 2025-02-07 ENCOUNTER — NON-APPOINTMENT (OUTPATIENT)
Age: 75
End: 2025-02-07

## 2025-02-07 ENCOUNTER — APPOINTMENT (OUTPATIENT)
Dept: ELECTROPHYSIOLOGY | Facility: CLINIC | Age: 75
End: 2025-02-07

## 2025-02-07 ENCOUNTER — APPOINTMENT (OUTPATIENT)
Dept: CARDIOLOGY | Facility: CLINIC | Age: 75
End: 2025-02-07
Payer: MEDICARE

## 2025-02-07 VITALS
SYSTOLIC BLOOD PRESSURE: 124 MMHG | HEIGHT: 64 IN | HEART RATE: 69 BPM | BODY MASS INDEX: 38.41 KG/M2 | DIASTOLIC BLOOD PRESSURE: 84 MMHG | WEIGHT: 225 LBS

## 2025-02-07 DIAGNOSIS — Z78.9 OTHER SPECIFIED HEALTH STATUS: ICD-10-CM

## 2025-02-07 DIAGNOSIS — R55 SYNCOPE AND COLLAPSE: ICD-10-CM

## 2025-02-07 DIAGNOSIS — I10 ESSENTIAL (PRIMARY) HYPERTENSION: ICD-10-CM

## 2025-02-07 DIAGNOSIS — E78.5 HYPERLIPIDEMIA, UNSPECIFIED: ICD-10-CM

## 2025-02-07 PROCEDURE — ZZZZZ: CPT

## 2025-02-07 PROCEDURE — 99205 OFFICE O/P NEW HI 60 MIN: CPT

## 2025-02-07 PROCEDURE — 93000 ELECTROCARDIOGRAM COMPLETE: CPT

## 2025-03-06 ENCOUNTER — APPOINTMENT (OUTPATIENT)
Dept: PULMONOLOGY | Facility: CLINIC | Age: 75
End: 2025-03-06
Payer: MEDICARE

## 2025-03-06 VITALS
WEIGHT: 225 LBS | DIASTOLIC BLOOD PRESSURE: 82 MMHG | HEIGHT: 64 IN | BODY MASS INDEX: 38.41 KG/M2 | SYSTOLIC BLOOD PRESSURE: 140 MMHG | HEART RATE: 79 BPM | OXYGEN SATURATION: 97 %

## 2025-03-06 DIAGNOSIS — G47.30 HYPERSOMNIA, UNSPECIFIED: ICD-10-CM

## 2025-03-06 DIAGNOSIS — R55 SYNCOPE AND COLLAPSE: ICD-10-CM

## 2025-03-06 DIAGNOSIS — G47.10 HYPERSOMNIA, UNSPECIFIED: ICD-10-CM

## 2025-03-06 DIAGNOSIS — E66.01 MORBID (SEVERE) OBESITY DUE TO EXCESS CALORIES: ICD-10-CM

## 2025-03-06 PROCEDURE — 99213 OFFICE O/P EST LOW 20 MIN: CPT

## 2025-03-13 ENCOUNTER — APPOINTMENT (OUTPATIENT)
Dept: UROLOGY | Facility: CLINIC | Age: 75
End: 2025-03-13

## 2025-03-13 VITALS
WEIGHT: 224 LBS | DIASTOLIC BLOOD PRESSURE: 80 MMHG | HEIGHT: 64 IN | BODY MASS INDEX: 38.24 KG/M2 | SYSTOLIC BLOOD PRESSURE: 140 MMHG

## 2025-03-13 DIAGNOSIS — E29.1 TESTICULAR HYPOFUNCTION: ICD-10-CM

## 2025-03-13 PROCEDURE — G2211 COMPLEX E/M VISIT ADD ON: CPT

## 2025-03-13 PROCEDURE — 99215 OFFICE O/P EST HI 40 MIN: CPT

## 2025-03-14 LAB
PSA SERPL-MCNC: 0.78 NG/ML
TESTOST SERPL-MCNC: 194 NG/DL

## 2025-03-17 ENCOUNTER — APPOINTMENT (OUTPATIENT)
Dept: CARDIOLOGY | Facility: CLINIC | Age: 75
End: 2025-03-17

## 2025-03-17 DIAGNOSIS — R55 SYNCOPE AND COLLAPSE: ICD-10-CM

## 2025-03-17 PROCEDURE — 93306 TTE W/DOPPLER COMPLETE: CPT

## 2025-04-04 ENCOUNTER — APPOINTMENT (OUTPATIENT)
Dept: ELECTROPHYSIOLOGY | Facility: CLINIC | Age: 75
End: 2025-04-04
Payer: MEDICARE

## 2025-04-04 VITALS
BODY MASS INDEX: 38.41 KG/M2 | SYSTOLIC BLOOD PRESSURE: 122 MMHG | WEIGHT: 225 LBS | DIASTOLIC BLOOD PRESSURE: 74 MMHG | HEIGHT: 64 IN | HEART RATE: 87 BPM

## 2025-04-04 DIAGNOSIS — E78.5 HYPERLIPIDEMIA, UNSPECIFIED: ICD-10-CM

## 2025-04-04 DIAGNOSIS — R55 SYNCOPE AND COLLAPSE: ICD-10-CM

## 2025-04-04 DIAGNOSIS — I10 ESSENTIAL (PRIMARY) HYPERTENSION: ICD-10-CM

## 2025-04-04 PROCEDURE — 93000 ELECTROCARDIOGRAM COMPLETE: CPT

## 2025-04-04 PROCEDURE — 99215 OFFICE O/P EST HI 40 MIN: CPT

## 2025-05-07 ENCOUNTER — APPOINTMENT (OUTPATIENT)
Dept: HEART AND VASCULAR | Facility: CLINIC | Age: 75
End: 2025-05-07

## 2025-05-07 DIAGNOSIS — E29.1 TESTICULAR HYPOFUNCTION: ICD-10-CM

## 2025-05-07 PROCEDURE — 36415 COLL VENOUS BLD VENIPUNCTURE: CPT

## 2025-05-08 LAB — TESTOST SERPL-MCNC: 364 NG/DL

## 2025-05-30 ENCOUNTER — APPOINTMENT (OUTPATIENT)
Dept: CARDIOLOGY | Facility: CLINIC | Age: 75
End: 2025-05-30

## 2025-05-30 ENCOUNTER — NON-APPOINTMENT (OUTPATIENT)
Age: 75
End: 2025-05-30

## 2025-05-30 VITALS
HEART RATE: 67 BPM | HEIGHT: 64 IN | BODY MASS INDEX: 38.24 KG/M2 | SYSTOLIC BLOOD PRESSURE: 143 MMHG | DIASTOLIC BLOOD PRESSURE: 80 MMHG | WEIGHT: 224 LBS

## 2025-05-30 PROCEDURE — 93000 ELECTROCARDIOGRAM COMPLETE: CPT

## 2025-05-30 PROCEDURE — 99214 OFFICE O/P EST MOD 30 MIN: CPT

## 2025-06-03 ENCOUNTER — NON-APPOINTMENT (OUTPATIENT)
Age: 75
End: 2025-06-03

## 2025-06-03 ENCOUNTER — APPOINTMENT (OUTPATIENT)
Dept: UROLOGY | Facility: CLINIC | Age: 75
End: 2025-06-03

## 2025-06-03 ENCOUNTER — APPOINTMENT (OUTPATIENT)
Dept: UROLOGY | Facility: CLINIC | Age: 75
End: 2025-06-03
Payer: MEDICARE

## 2025-06-03 VITALS
SYSTOLIC BLOOD PRESSURE: 142 MMHG | HEIGHT: 64 IN | BODY MASS INDEX: 38.41 KG/M2 | WEIGHT: 225 LBS | DIASTOLIC BLOOD PRESSURE: 85 MMHG

## 2025-06-03 DIAGNOSIS — E29.1 TESTICULAR HYPOFUNCTION: ICD-10-CM

## 2025-06-03 PROCEDURE — 99214 OFFICE O/P EST MOD 30 MIN: CPT

## 2025-06-03 PROCEDURE — G2211 COMPLEX E/M VISIT ADD ON: CPT

## (undated) DEVICE — MAKO DRAPE KIT

## (undated) DEVICE — MAKO BLADE STANDARD

## (undated) DEVICE — MAKO STRYKER SILICONE RETRACTOR CORD

## (undated) DEVICE — DRSG COMBINE 5X9"

## (undated) DEVICE — SUT STRATAFIX SPIRAL PDO 0 24CM CP-2

## (undated) DEVICE — LEG WRAP DISP STRL

## (undated) DEVICE — DRSG COBAN 6"

## (undated) DEVICE — DRSG DERMABOND PRINEO 60CM

## (undated) DEVICE — MAKO VIZADISC KNEE TRACKING KIT

## (undated) DEVICE — WOUND IRR SURGIPHOR

## (undated) DEVICE — HOOD T5 PEELAWAY